# Patient Record
Sex: FEMALE | Race: WHITE | NOT HISPANIC OR LATINO | Employment: OTHER | URBAN - METROPOLITAN AREA
[De-identification: names, ages, dates, MRNs, and addresses within clinical notes are randomized per-mention and may not be internally consistent; named-entity substitution may affect disease eponyms.]

---

## 2017-01-26 ENCOUNTER — ALLSCRIPTS OFFICE VISIT (OUTPATIENT)
Dept: OTHER | Facility: OTHER | Age: 42
End: 2017-01-26

## 2017-01-26 DIAGNOSIS — Z12.31 ENCOUNTER FOR SCREENING MAMMOGRAM FOR MALIGNANT NEOPLASM OF BREAST: ICD-10-CM

## 2017-01-31 LAB
HPV 18 (HISTORICAL): NOT DETECTED
HPV HIGH RISK 16/18 (HISTORICAL): NOT DETECTED
HPV16 (HISTORICAL): NOT DETECTED
PAP (HISTORICAL): NORMAL

## 2018-01-13 VITALS
SYSTOLIC BLOOD PRESSURE: 130 MMHG | HEIGHT: 70 IN | DIASTOLIC BLOOD PRESSURE: 82 MMHG | BODY MASS INDEX: 34.07 KG/M2 | WEIGHT: 238 LBS

## 2018-01-17 NOTE — PROGRESS NOTES
Assessment    1  Encounter for routine gynecological examination (V72 31) (Z01 419)    Plan  Encounter for routine gynecological examination    · Norethin-Eth Estradiol-Fe 0 8-25 MG-MCG Oral Tablet Chewable (Generess FE); 1  tablet po chewed q day   Rx By: Jacky Alfonso; Dispense: 0 Days ; #:30 Tablet Chewable; Refill: 11; For: Encounter for routine gynecological examination; ALEX = N; Verified Transmission to [address]; Last Updated By: System, [employer]; 1/20/2016 9:33:42 AM   · Hemoccult Screening - POC; Status:Complete;   Done: [id number] 09:30AM   Performed: In Office; SDN:[id number];WVFSAXW;  David Pyo for routine gynecological examination; Ordered By:Janna Perez;   · Vitamins can help you get daily requirements that your diet may not be giving you ;  Status:Complete;   Done: [id number]   Ordered;  For:Encounter for routine gynecological examination; Ordered By:Ambrosio Perez;   · We encourage all of our patients to exercise regularly  30 minutes of exercise or physical  activity five or more days a week is recommended for children and adults ;  Status:Complete;   Done: 49GFJ9959   Ordered;  For:Encounter for routine gynecological examination; Ordered By:Ambrosio Perez;   · Follow-up visit in 1 year Evaluation and Treatment  Follow-up  Status: Complete  Done:  [id number]   Ordered;  For: Encounter for routine gynecological examination; Ordered By: Jacky Alfonso Performed:  Due: [id number]; Last Updated By: Edd Mendenhall; 1/20/2016 10:16:08 AM  Encounter for screening mammogram for breast cancer    · MAMMO SCREENING BILATERAL W 3D & CAD; Status:Hold For - Scheduling; Requested  for:20Jan2016;    Perform:[facility]iology; GYJ:[id number];QTLXCBI;  David Pyo for screening mammogram for breast cancer; Ordered By:Ambrosio Perez;    Discussion/Summary  R/jenny pap smear guidelines and patient history/results/risks - pt ok to defer pap this year; stressed importance of APE q year; plan repeat cotesting at next APE Breast cancer screening: monthly self breast exam was advised, mammogram has been ordered and mammogram is needed every year  Continue Generess Fe 1 tab po chewed q day  Encourage calcium and vit D through diet; Encourage 30-40 min weight bearing exercise most days of week  RTO for APE or sooner if needed  Chief Complaint  APE      History of Present Illness  HPI: No vulvo-vaginal itch/burn/abn discharge or odor; No urinary sx - burning/pain/frequency/hematuria  (+) SBEs - no lumps, asymmetry, nipple discharge, or changes in skin of breast b/l  No abd/pelvic pain or HAs;  Not currently in a sexual relationship; declines std/hiv/hep testing  Contraception - Generess Fe  (+) PCP for routine BW/care;       GYN HM, Adult Female [facility]:   General Health:   Reproductive health:  she reports no menstrual problems  Menstrual history:  age at menarche was 11  LMP: the last menstrual period was 12/30/15  The cycles occur approximately every 28 days  The duration of her recent periods usually last 2-3 days  Menstrual Problems: No cristian/metrohagia/dysmenorrhea  Screening: Cervical cancer screening includes 1/15/14 - WNL (-) HRHPV type 16 and 18 neg  Breast cancer screening includes 7/16/15 - Birad 1 neg  Review of Systems    Constitutional: not feeling poorly, no recent weight gain, not feeling tired and no recent weight loss  Cardiovascular: no chest pain  Respiratory: no shortness of breath  Breasts: no complaints of breast pain, breast lump or nipple discharge  Gastrointestinal: no complaints of abdominal pain, no constipation, no nausea or diarrhea, no vomiting, no bloody stools  Genitourinary: no complaints of dysuria, no incontinence, no pelvic pain, no dysmenorrhea, no vaginal discharge or abnormal vaginal bleeding  OB History  Pregnancy History (Brief):   Prior pregnancies: : 1  Para: 1 (full-term) and 1 (living)   Delivery type: 1 vaginal   2007 vag F      Active Problems    1  Contraception management (V25 9) (Z30 9)    Past Medical History    · History of Breast cancer screening (V76 10) (Z12 39)   · History of  1 para 1 (V49 89) (Z78 9)   · History of Menarche (V21 8)   · History of Visit for routine gyn exam (V72 31) (Z01 419)    Surgical History    · History of Gastric Surgery For Morbid Obesity Gastric Bypass    Family History    · Family history of Benign essential hypertension    · Family history of Benign essential hypertension    Social History    · Cultural background   · NON-   ·    · Exercises regularly   · Denied: History of H/O domestic violence   · Never a smoker   · No alcohol use   · No drug use   · Occupation   ·    · Oral contraceptives use   · Primary spoken language English   · Racial background   · WHITE    Current Meds   1  Daily Value Multivitamin Oral Tablet; Therapy: (Recorded:2016) to Recorded   2  Iron TABS; Therapy: (Recorded:2016) to Recorded   3  Layolis FE 0 8-25 MG-MCG Oral Tablet Chewable; TAKE ONE (1) TABLET(S) DAILY; Therapy: [id number] to ([facility])  Requested for: 82GOI6171; Last   Rx:60Rrf0038 Ordered   4  Vitamin D TABS; Therapy: (Recorded:2016) to Recorded    Allergies    1  No Known Drug Allergies    Vitals   Recorded: 56IJH7549 20:00EY   Systolic 062, LUE, Sitting   Diastolic 78, LUE, Sitting   BP Cuff Size Large   Height 5 ft 9 in   Weight 242 lb    BMI Calculated 35 74   BSA Calculated 2 24   LMP [id number]     Physical Exam    Constitutional   General appearance: No acute distress, well appearing and well nourished  Neck   Neck: Normal, supple, trachea midline, no masses  Thyroid: Normal, no thyromegaly  Pulmonary   Respiratory effort: No increased work of breathing or signs of respiratory distress  Auscultation of lungs: Clear to auscultation  Cardiovascular   Auscultation of heart: Normal rate and rhythm, normal S1 and S2, no murmurs      Genitourinary   External genitalia: Normal and no lesions appreciated  Vagina: Normal, no lesions or dryness appreciated  Urethra: Normal     Urethral meatus: Normal     Bladder: Normal, soft, non-tender and no prolapse or masses appreciated  Cervix: Normal, no palpable masses  Uterus: Normal, non-tender, not enlarged, and no palpable masses  Adnexa/parametria: Normal, non-tender and no fullness or masses appreciated  Anus, perineum, and rectum: Normal sphincter tone, no masses, and no prolapse  Chest   Breasts: Normal and no dimpling or skin changes noted  Abdomen   Abdomen: Normal, non-tender, and no organomegaly noted  Liver and spleen: No hepatomegaly or splenomegaly  Stool sample for occult blood: Negative  Lymphatic   Palpation of lymph nodes in neck, axillae, groin and/or other locations: No lymphadenopathy or masses noted  Skin   Skin and subcutaneous tissue: Normal skin turgor and no rashes  Results/Data  Hemoccult Screening - POC 20Jan2016 09:30AM Marino Finch     Test Name Result Flag Reference   Hemoccult Negative         Attending Note  Collaborating Physician Note: Collaborating Note: I agree with the Advanced Practitioner note  I discussed the case with the Advanced Practitioner and reviewed the AP note      Signatures   Electronically signed by :  Anabel Webb, [facility]; Jan 20 2016  9:35AM EST                       (Author)    Electronically signed by : Milla Up MD; Jan 20 2016  1:33PM EST                       (Author)

## 2018-01-30 ENCOUNTER — OFFICE VISIT (OUTPATIENT)
Dept: OBGYN CLINIC | Facility: CLINIC | Age: 43
End: 2018-01-30
Payer: COMMERCIAL

## 2018-01-30 VITALS
SYSTOLIC BLOOD PRESSURE: 130 MMHG | DIASTOLIC BLOOD PRESSURE: 82 MMHG | HEIGHT: 69 IN | BODY MASS INDEX: 38.36 KG/M2 | WEIGHT: 259 LBS

## 2018-01-30 DIAGNOSIS — Z12.31 SCREENING MAMMOGRAM, ENCOUNTER FOR: ICD-10-CM

## 2018-01-30 DIAGNOSIS — Z01.419 ENCOUNTER FOR GYNECOLOGICAL EXAMINATION WITHOUT ABNORMAL FINDING: Primary | ICD-10-CM

## 2018-01-30 DIAGNOSIS — Z30.41 ENCOUNTER FOR SURVEILLANCE OF CONTRACEPTIVE PILLS: ICD-10-CM

## 2018-01-30 PROCEDURE — S0612 ANNUAL GYNECOLOGICAL EXAMINA: HCPCS | Performed by: PHYSICIAN ASSISTANT

## 2018-01-30 RX ORDER — NORETHINDRONE AND ETHINYL ESTRADIOL AND FERROUS FUMARATE 0.8-25(24)
1 KIT ORAL DAILY
Qty: 28 TABLET | Refills: 11 | Status: SHIPPED | OUTPATIENT
Start: 2018-01-30 | End: 2019-01-11 | Stop reason: SDUPTHER

## 2018-01-30 RX ORDER — CYCLOSPORINE 0.5 MG/ML
EMULSION OPHTHALMIC
Refills: 6 | COMMUNITY
Start: 2018-01-20

## 2018-01-30 RX ORDER — TAFLUPROST 0 MG/.3ML
SOLUTION/ DROPS OPHTHALMIC
Refills: 11 | COMMUNITY
Start: 2018-01-11 | End: 2020-08-25 | Stop reason: ALTCHOICE

## 2018-01-30 RX ORDER — NORETHINDRONE AND ETHINYL ESTRADIOL AND FERROUS FUMARATE 0.8-25(24)
1 KIT ORAL DAILY
COMMUNITY
Start: 2015-12-07 | End: 2018-01-30 | Stop reason: SDUPTHER

## 2018-01-30 NOTE — ASSESSMENT & PLAN NOTE
Pap deferred secondary to normal pap 2016  Will continue Laylois OCP  RTO for annual exam in 1 year

## 2018-01-30 NOTE — PROGRESS NOTES
Assessment/Plan:    Encounter for gynecological examination without abnormal finding  Pap deferred secondary to normal pap 2016  Will continue Laylois OCP  RTO for annual exam in 1 year  Problem List Items Addressed This Visit     Encounter for gynecological examination without abnormal finding - Primary     Pap deferred secondary to normal pap 2016  Will continue Laylois OCP  RTO for annual exam in 1 year  Other Visit Diagnoses     Encounter for surveillance of contraceptive pills        Relevant Medications    Norethin-Eth Estradiol-Fe (LAYOLIS FE) 0 8-25 MG-MCG CHEW    Screening mammogram, encounter for        Relevant Orders    Mammo screening bilateral w cad            Subjective:      Patient ID: Porfirio Liu is a 38 y o  female  HPI  39 yo seen for annual exam  Currently on Layolis Fe tolerating well would like to continue  Menses regular heavy on just one day every other month  Denies bowel or bladder issues  Last pap: 1/26/2017 NILM (-)HRHPV  Last mammogram: 2015 BIRADS 1: negative  The following portions of the patient's history were reviewed and updated as appropriate:   She  has a past medical history of Glaucoma  She  does not have any pertinent problems on file  She  has a past surgical history that includes Gastric bypass and Hernia repair  Her family history includes Hyperlipidemia in her maternal grandmother and mother; Hypertension in her maternal grandmother and mother  She  reports that she has never smoked  She has never used smokeless tobacco  She reports that she drinks about 1 2 oz of alcohol per week   She reports that she does not use drugs    Current Outpatient Prescriptions   Medication Sig Dispense Refill    Norethin-Eth Estradiol-Fe (LAYOLIS FE) 0 8-25 MG-MCG CHEW Chew 1 tablet daily 28 tablet 11    cholecalciferol (VITAMIN D3) 1,000 units tablet Take by mouth      Iron Combinations (IRON COMPLEX) CAPS Take by mouth      Multiple Vitamins-Minerals (MULTIVITAMIN ADULT PO) Take by mouth      RESTASIS 0 05 % ophthalmic emulsion INSTILL ONE DROP INTO BOTH EYES EVERY 12 HOURS  6    ZIOPTAN 0 0015 % ophthalmic solution INSTILL ONE DROP TO EACH EYE EVERY DAY  11     No current facility-administered medications for this visit  She has No Known Allergies     Menstrual History:  OB History      Para Term  AB Living    1 1 1     1    SAB TAB Ectopic Multiple Live Births            1         Menarche age: 11  Patient's last menstrual period was 2018  Period Cycle (Days): 28  Period Duration (Days): 3-4  Period Pattern: Regular  Menstrual Flow: Moderate  Dysmenorrhea: None  Review of Systems   Constitutional: Negative for fatigue, fever and unexpected weight change  HENT: Negative for dental problem and sinus pressure  Eyes: Negative for visual disturbance  Respiratory: Negative for cough, shortness of breath and wheezing  Cardiovascular: Negative for chest pain  Gastrointestinal: Negative for abdominal pain, blood in stool, constipation, diarrhea, nausea and vomiting  Endocrine: Negative for polydipsia  Genitourinary: Negative for difficulty urinating, dyspareunia, dysuria, frequency, hematuria, pelvic pain and urgency  Musculoskeletal: Negative for arthralgias and back pain  Neurological: Negative for dizziness, seizures, light-headedness and headaches  Psychiatric/Behavioral: Negative for suicidal ideas  The patient is not nervous/anxious  Objective:     Physical Exam   Constitutional: She is oriented to person, place, and time  She appears well-developed and well-nourished  Neck: Neck supple  No thyroid mass and no thyromegaly present  Cardiovascular: Normal rate, regular rhythm and normal heart sounds  Exam reveals no gallop and no friction rub  No murmur heard  Pulmonary/Chest: Effort normal and breath sounds normal  No respiratory distress  She has no wheezes  She has no rales  Abdominal: Soft  Normal appearance and bowel sounds are normal  She exhibits no distension and no mass  There is no tenderness  There is no rebound and no guarding  Genitourinary: Rectum normal  Rectal exam shows guaiac negative stool  No breast swelling, tenderness, discharge or bleeding  There is no rash, tenderness, lesion or injury on the right labia  There is no rash, tenderness, lesion or injury on the left labia  Uterus is not deviated, not enlarged and not tender  Cervix exhibits no motion tenderness, no discharge and no friability  Right adnexum displays no mass, no tenderness and no fullness  Left adnexum displays no mass, no tenderness and no fullness  No erythema, tenderness or bleeding in the vagina  No signs of injury around the vagina  No vaginal discharge found  Lymphadenopathy:     She has no cervical adenopathy  Right axillary: No pectoral and no lateral adenopathy present  Left axillary: No pectoral and no lateral adenopathy present  Right: No inguinal and no supraclavicular adenopathy present  Left: No inguinal and no supraclavicular adenopathy present  Neurological: She is alert and oriented to person, place, and time  Skin: Skin is warm and dry  No rash noted  No erythema  No pallor  Psychiatric: She has a normal mood and affect   Her behavior is normal  Judgment and thought content normal

## 2019-01-11 DIAGNOSIS — Z30.41 ENCOUNTER FOR SURVEILLANCE OF CONTRACEPTIVE PILLS: ICD-10-CM

## 2019-01-11 RX ORDER — NORETHINDRONE AND ETHINYL ESTRADIOL 0.8-25(24)
KIT ORAL
Qty: 28 TABLET | Refills: 0 | Status: SHIPPED | OUTPATIENT
Start: 2019-01-11 | End: 2019-02-04 | Stop reason: SDUPTHER

## 2019-01-30 NOTE — PROGRESS NOTES
Assessment/Plan   Diagnoses and all orders for this visit:    Encounter for gynecological examination without abnormal finding    Breast cancer screening  -     Mammo screening bilateral w 3d & cad; Future    Encounter for surveillance of contraceptive pills  -     Norethin-Eth Estradiol-Fe (KAITLIB FE) 0 8-25 MG-MCG CHEW; Chew 1 tablet daily Chew and swallow        Discussion    All questions have been answered to her satisfaction  RTO for APE or sooner if needed      Subjective     Pt for annual GYN exam  Currently using OCPs for [facility] and cycle control  Questions OCP use long term  I did r/w pt perimenopause and benefits of pill use as well as risks/benefits  Pt monogamous declines problems w IC, declines desire for STD work up  Janice Mariscal is a 37 y o  female who presents for annual well woman exam    LMP - 1/25/19  Periods are regular q 28 days, lasts 3-4 days  No vulvar itch/burn; No vaginal itch/burn; No abn discharge or odor; No urinary sx - burning/pain/frequency/hematuria  (+) SBEs - no breast masses, asymmetry, nipple discharge or bleeding, changes in skin of breast, or breast tenderness bilaterally  No abd/pelvic pain or HAs; No menopausal symptoms: No hot flashes/night sweats, problems with intercourse, vaginal dryness; sleeping well  Pt is sexually active in a mutually monog/ sexual relationship; No issues with intercourse; She declines std/hiv/hep testing; Feels safe at home    (+) PCP for routine Bw/care- Dr Farhan Joiner    Last Pap - 1/26/17 WNL neg HPV  History of abnormal Pap smear:none  Last mammo - 2011  History of abnormal mammogram: none      Review of Systems   Constitutional: Negative for fatigue, fever and unexpected weight change  HENT: Negative for dental problem, mouth sores, nosebleeds, rhinorrhea, sinus pain, sinus pressure and sore throat  Eyes: Negative for pain, discharge and visual disturbance     Respiratory: Negative for cough, chest tightness, shortness of breath and wheezing  Cardiovascular: Negative for chest pain, palpitations and leg swelling  Gastrointestinal: Negative for blood in stool, constipation, diarrhea, nausea and vomiting  Endocrine: Negative for polydipsia  Genitourinary: Negative for difficulty urinating, dyspareunia, dysuria, menstrual problem, pelvic pain, urgency, vaginal discharge and vaginal pain  Musculoskeletal: Negative for arthralgias, back pain and joint swelling  Allergic/Immunologic: Negative for environmental allergies  Neurological: Negative for seizures, light-headedness and headaches  Hematological: Does not bruise/bleed easily  Psychiatric/Behavioral: Negative for sleep disturbance  The patient is not nervous/anxious          The following portions of the patient's history were reviewed and updated as appropriate: allergies, current medications, past family history, past medical history, past social history, past surgical history and problem list          OB History      Para Term  AB Living    1 1 1     1    SAB TAB Ectopic Multiple Live Births            1          Past Medical History:   Diagnosis Date    Glaucoma        Past Surgical History:   Procedure Laterality Date    GASTRIC BYPASS      surgery for morbid obesity 2012    HERNIA REPAIR      incisional       Family History   Problem Relation Age of Onset    Hypertension Mother         benign essential    Hyperlipidemia Mother     Hypertension Maternal Grandmother         benign essential    Hyperlipidemia Maternal Grandmother        Social History     Social History    Marital status: /Civil Union     Spouse name: N/A    Number of children: N/A    Years of education: N/A     Occupational History          Social History Main Topics    Smoking status: Never Smoker    Smokeless tobacco: Never Used    Alcohol use 1 2 oz/week     2 Standard drinks or equivalent per week    Drug use: No    Sexual activity: Yes Partners: Male     Birth control/ protection: Pill     Other Topics Concern    Not on file     Social History Narrative    Non  -White    Exercises regularly     as per [employer]    Primary language - English             Current Outpatient Prescriptions:     cholecalciferol (VITAMIN D3) 1,000 units tablet, Take by mouth, Disp: , Rfl:     Iron Combinations (IRON COMPLEX) CAPS, Take by mouth, Disp: , Rfl:     Multiple Vitamins-Minerals (MULTIVITAMIN ADULT PO), Take by mouth, Disp: , Rfl:     Norethin-Eth Estradiol-Fe (KAITLIB FE) 0 8-25 MG-MCG CHEW, Chew 1 tablet daily Chew and swallow, Disp: 28 tablet, Rfl: 11    RESTASIS 0 05 % ophthalmic emulsion, INSTILL ONE DROP INTO BOTH EYES EVERY 12 HOURS, Disp: , Rfl: 6    ZIOPTAN 0 0015 % ophthalmic solution, INSTILL ONE DROP TO EACH EYE EVERY DAY, Disp: , Rfl: 11    No Known Allergies    Objective   Vitals:    02/04/19 0904   BP: 124/84   BP Location: Left arm   Patient Position: Sitting   Cuff Size: Large   Weight: 125 kg (276 lb)   Height: 5' 9" (1 753 m)     Physical Exam   Constitutional: She is oriented to person, place, and time  She appears well-developed and well-nourished  HENT:   Head: Normocephalic and atraumatic  Cardiovascular: Normal rate and regular rhythm  Pulmonary/Chest: Effort normal and breath sounds normal  Right breast exhibits no inverted nipple, no mass, no nipple discharge, no skin change and no tenderness  Left breast exhibits no inverted nipple, no mass, no nipple discharge, no skin change and no tenderness  Breasts are symmetrical    Abdominal: Soft  She exhibits no distension and no mass  There is no rebound and no guarding  Genitourinary: Vagina normal and uterus normal  Rectal exam shows guaiac negative stool  No vaginal discharge found  Neurological: She is alert and oriented to person, place, and time  Skin: Skin is warm and dry  Psychiatric: She has a normal mood and affect         Patient Instructions F/u 1 year, earlier as needed  Advised importance of mammo  Continue PCP f/u for routine care

## 2019-02-04 ENCOUNTER — ANNUAL EXAM (OUTPATIENT)
Dept: OBGYN CLINIC | Facility: CLINIC | Age: 44
End: 2019-02-04
Payer: COMMERCIAL

## 2019-02-04 VITALS
SYSTOLIC BLOOD PRESSURE: 124 MMHG | WEIGHT: 276 LBS | BODY MASS INDEX: 40.88 KG/M2 | DIASTOLIC BLOOD PRESSURE: 84 MMHG | HEIGHT: 69 IN

## 2019-02-04 DIAGNOSIS — Z12.39 BREAST CANCER SCREENING: ICD-10-CM

## 2019-02-04 DIAGNOSIS — Z30.41 ENCOUNTER FOR SURVEILLANCE OF CONTRACEPTIVE PILLS: ICD-10-CM

## 2019-02-04 DIAGNOSIS — Z01.419 ENCOUNTER FOR GYNECOLOGICAL EXAMINATION WITHOUT ABNORMAL FINDING: Primary | ICD-10-CM

## 2019-02-04 PROCEDURE — S0612 ANNUAL GYNECOLOGICAL EXAMINA: HCPCS | Performed by: PHYSICIAN ASSISTANT

## 2019-02-04 RX ORDER — NORETHINDRONE AND ETHINYL ESTRADIOL AND FERROUS FUMARATE 0.8-25(24)
1 KIT ORAL DAILY
Qty: 28 TABLET | Refills: 11 | Status: SHIPPED | OUTPATIENT
Start: 2019-02-04 | End: 2020-01-09

## 2019-07-27 ENCOUNTER — APPOINTMENT (EMERGENCY)
Dept: CT IMAGING | Facility: HOSPITAL | Age: 44
End: 2019-07-27
Payer: COMMERCIAL

## 2019-07-27 ENCOUNTER — HOSPITAL ENCOUNTER (EMERGENCY)
Facility: HOSPITAL | Age: 44
Discharge: HOME | End: 2019-07-28
Attending: EMERGENCY MEDICINE | Admitting: EMERGENCY MEDICINE
Payer: COMMERCIAL

## 2019-07-27 DIAGNOSIS — R55 SYNCOPE: Primary | ICD-10-CM

## 2019-07-27 DIAGNOSIS — E03.9 HYPOTHYROID: ICD-10-CM

## 2019-07-27 DIAGNOSIS — E87.6 HYPOKALEMIA: ICD-10-CM

## 2019-07-27 LAB
ALBUMIN SERPL BCP-MCNC: 4 G/DL (ref 3–5.2)
ALP SERPL-CCNC: 64 U/L (ref 43–122)
ALT SERPL W P-5'-P-CCNC: 28 U/L (ref 9–52)
ANION GAP SERPL CALCULATED.3IONS-SCNC: 10 MMOL/L (ref 5–14)
AST SERPL W P-5'-P-CCNC: 27 U/L (ref 14–36)
BACTERIA UR QL AUTO: ABNORMAL /HPF
BASOPHILS # BLD AUTO: 0.1 THOUSANDS/ΜL (ref 0–0.1)
BASOPHILS NFR BLD AUTO: 1 % (ref 0–1)
BILIRUB SERPL-MCNC: 0.2 MG/DL
BILIRUB UR QL STRIP: NEGATIVE
BUN SERPL-MCNC: 13 MG/DL (ref 5–25)
CALCIUM SERPL-MCNC: 9.1 MG/DL (ref 8.4–10.2)
CHLORIDE SERPL-SCNC: 108 MMOL/L (ref 97–108)
CK SERPL-CCNC: 67 U/L (ref 30–135)
CLARITY UR: CLEAR
CO2 SERPL-SCNC: 22 MMOL/L (ref 22–30)
COLOR UR: YELLOW
CREAT SERPL-MCNC: 1.22 MG/DL (ref 0.6–1.2)
EOSINOPHIL # BLD AUTO: 0.2 THOUSAND/ΜL (ref 0–0.4)
EOSINOPHIL NFR BLD AUTO: 2 % (ref 0–6)
ERYTHROCYTE [DISTWIDTH] IN BLOOD BY AUTOMATED COUNT: 14 %
EXT PREG TEST URINE: NEGATIVE
EXT. CONTROL ED NAV: NORMAL
GFR SERPL CREATININE-BSD FRML MDRD: 53 ML/MIN/1.73SQ M
GLUCOSE SERPL-MCNC: 60 MG/DL (ref 70–99)
GLUCOSE UR STRIP-MCNC: ABNORMAL MG/DL
HCT VFR BLD AUTO: 36.7 % (ref 36–46)
HGB BLD-MCNC: 12 G/DL (ref 12–16)
HGB UR QL STRIP.AUTO: NEGATIVE
KETONES UR STRIP-MCNC: NEGATIVE MG/DL
LEUKOCYTE ESTERASE UR QL STRIP: NEGATIVE
LYMPHOCYTES # BLD AUTO: 2.8 THOUSANDS/ΜL (ref 0.5–4)
LYMPHOCYTES NFR BLD AUTO: 32 % (ref 25–45)
MAGNESIUM SERPL-MCNC: 2.1 MG/DL (ref 1.6–2.3)
MCH RBC QN AUTO: 29.5 PG (ref 26–34)
MCHC RBC AUTO-ENTMCNC: 32.6 G/DL (ref 31–36)
MCV RBC AUTO: 91 FL (ref 80–100)
MONOCYTES # BLD AUTO: 0.6 THOUSAND/ΜL (ref 0.2–0.9)
MONOCYTES NFR BLD AUTO: 7 % (ref 1–10)
MUCOUS THREADS UR QL AUTO: ABNORMAL
NEUTROPHILS # BLD AUTO: 5.1 THOUSANDS/ΜL (ref 1.8–7.8)
NEUTS SEG NFR BLD AUTO: 58 % (ref 45–65)
NITRITE UR QL STRIP: NEGATIVE
NON-SQ EPI CELLS URNS QL MICRO: ABNORMAL /HPF
PH UR STRIP.AUTO: 5 [PH]
PHOSPHATE SERPL-MCNC: 2.4 MG/DL (ref 2.5–4.8)
PLATELET # BLD AUTO: 330 THOUSANDS/UL (ref 150–450)
PMV BLD AUTO: 7.9 FL (ref 8.9–12.7)
POTASSIUM SERPL-SCNC: 3.3 MMOL/L (ref 3.6–5)
PROT SERPL-MCNC: 7.2 G/DL (ref 5.9–8.4)
PROT UR STRIP-MCNC: NEGATIVE MG/DL
RBC # BLD AUTO: 4.06 MILLION/UL (ref 4–5.2)
RBC #/AREA URNS AUTO: ABNORMAL /HPF
SODIUM SERPL-SCNC: 140 MMOL/L (ref 137–147)
SP GR UR STRIP.AUTO: 1.02 (ref 1–1.04)
TROPONIN I SERPL-MCNC: <0.01 NG/ML (ref 0–0.03)
UROBILINOGEN UA: NEGATIVE MG/DL
WBC # BLD AUTO: 8.8 THOUSAND/UL (ref 4.5–11)
WBC #/AREA URNS AUTO: ABNORMAL /HPF

## 2019-07-27 PROCEDURE — 84443 ASSAY THYROID STIM HORMONE: CPT | Performed by: EMERGENCY MEDICINE

## 2019-07-27 PROCEDURE — 93005 ELECTROCARDIOGRAM TRACING: CPT

## 2019-07-27 PROCEDURE — 99285 EMERGENCY DEPT VISIT HI MDM: CPT

## 2019-07-27 PROCEDURE — 85025 COMPLETE CBC W/AUTO DIFF WBC: CPT | Performed by: EMERGENCY MEDICINE

## 2019-07-27 PROCEDURE — 83735 ASSAY OF MAGNESIUM: CPT | Performed by: EMERGENCY MEDICINE

## 2019-07-27 PROCEDURE — 81025 URINE PREGNANCY TEST: CPT | Performed by: EMERGENCY MEDICINE

## 2019-07-27 PROCEDURE — 84100 ASSAY OF PHOSPHORUS: CPT | Performed by: EMERGENCY MEDICINE

## 2019-07-27 PROCEDURE — 81001 URINALYSIS AUTO W/SCOPE: CPT | Performed by: EMERGENCY MEDICINE

## 2019-07-27 PROCEDURE — 36415 COLL VENOUS BLD VENIPUNCTURE: CPT | Performed by: EMERGENCY MEDICINE

## 2019-07-27 PROCEDURE — 84439 ASSAY OF FREE THYROXINE: CPT | Performed by: EMERGENCY MEDICINE

## 2019-07-27 PROCEDURE — 70450 CT HEAD/BRAIN W/O DYE: CPT

## 2019-07-27 PROCEDURE — 96360 HYDRATION IV INFUSION INIT: CPT

## 2019-07-27 PROCEDURE — 99284 EMERGENCY DEPT VISIT MOD MDM: CPT | Performed by: EMERGENCY MEDICINE

## 2019-07-27 PROCEDURE — 80053 COMPREHEN METABOLIC PANEL: CPT | Performed by: EMERGENCY MEDICINE

## 2019-07-27 PROCEDURE — 84484 ASSAY OF TROPONIN QUANT: CPT | Performed by: EMERGENCY MEDICINE

## 2019-07-27 PROCEDURE — 82550 ASSAY OF CK (CPK): CPT | Performed by: EMERGENCY MEDICINE

## 2019-07-27 RX ADMIN — SODIUM CHLORIDE 1000 ML: 0.9 INJECTION, SOLUTION INTRAVENOUS at 22:55

## 2019-07-28 VITALS
BODY MASS INDEX: 42.42 KG/M2 | TEMPERATURE: 98.2 F | HEART RATE: 80 BPM | OXYGEN SATURATION: 100 % | SYSTOLIC BLOOD PRESSURE: 133 MMHG | DIASTOLIC BLOOD PRESSURE: 78 MMHG | RESPIRATION RATE: 20 BRPM | WEIGHT: 286.38 LBS | HEIGHT: 69 IN

## 2019-07-28 LAB
GLUCOSE SERPL-MCNC: 154 MG/DL (ref 65–140)
T4 FREE SERPL-MCNC: 0.82 NG/DL (ref 0.76–1.46)
TSH SERPL DL<=0.05 MIU/L-ACNC: 18.3 UIU/ML (ref 0.47–4.68)

## 2019-07-28 PROCEDURE — 82948 REAGENT STRIP/BLOOD GLUCOSE: CPT

## 2019-07-28 RX ORDER — SACCHAROMYCES BOULARDII 250 MG
250 CAPSULE ORAL 2 TIMES DAILY
COMMUNITY
End: 2019-07-28 | Stop reason: CLARIF

## 2019-07-28 RX ORDER — POTASSIUM CHLORIDE 750 MG/1
20 TABLET, EXTENDED RELEASE ORAL ONCE
Status: COMPLETED | OUTPATIENT
Start: 2019-07-28 | End: 2019-07-28

## 2019-07-28 RX ORDER — MULTIVIT WITH MINERALS/LUTEIN
1000 TABLET ORAL DAILY
COMMUNITY

## 2019-07-28 RX ADMIN — POTASSIUM CHLORIDE 20 MEQ: 10 TABLET, EXTENDED RELEASE ORAL at 01:00

## 2019-07-28 NOTE — ED PROVIDER NOTES
History  Chief Complaint   Patient presents with    Syncope     Patient was at the [employer] game and had a syncopal episode times 2, does not remember everything and possibly hit her head  52 y/o female BBA for c/o low blood pressure, dizziness, and syncope x 2 episodes while at ball game tonight  Patient alert, cooperative, and oriented  States had two small cans of beer at game; denies illicit drugs  Prior history of syncope  Denies chest pain, dyspnea, fever/chills, headache, and/or visual disturbances  Per patient, unsure as to any closed head trauma  Patient states fell while attempting to go from sitting to standing position  Given IVFs en route; patient asymptomatic  Prior to Admission Medications   Prescriptions Last Dose Informant Patient Reported? Taking?    Ascorbic Acid (VITAMIN C) 1000 MG tablet 7/27/2019 at Unknown time  Yes Yes   Sig: Take 1,000 mg by mouth daily   Iron Combinations (IRON COMPLEX) CAPS More than a month at Unknown time  Yes No   Sig: Take by mouth   Multiple Vitamins-Minerals (MULTIVITAMIN ADULT PO) 7/27/2019 at Unknown time  Yes Yes   Sig: Take by mouth   Norethin-Eth Estradiol-Fe (KAITLIB FE) 0 8-25 MG-MCG CHEW 7/27/2019 at Unknown time  No Yes   Sig: Chew 1 tablet daily Chew and swallow   RESTASIS 0 05 % ophthalmic emulsion 7/27/2019 at Unknown time  Yes Yes   Sig: INSTILL ONE DROP INTO BOTH EYES EVERY 12 HOURS   ZIOPTAN 0 0015 % ophthalmic solution Past Week at Unknown time  Yes Yes   Sig: INSTILL ONE DROP TO EACH EYE EVERY DAY   cholecalciferol (VITAMIN D3) 1,000 units tablet Past Week at Unknown time  Yes Yes   Sig: Take by mouth      Facility-Administered Medications: None       Past Medical History:   Diagnosis Date    Glaucoma        Past Surgical History:   Procedure Laterality Date    GASTRIC BYPASS      surgery for morbid obesity 12/2012    HERNIA REPAIR      incisional       Family History   Problem Relation Age of Onset    Hypertension Mother benign essential    Hyperlipidemia Mother     Hypertension Maternal Grandmother         benign essential    Hyperlipidemia Maternal Grandmother      I have reviewed and agree with the history as documented  Social History     Tobacco Use    Smoking status: Never Smoker    Smokeless tobacco: Never Used   Substance Use Topics    Alcohol use: Yes     Alcohol/week: 2 0 standard drinks     Types: 2 Standard drinks or equivalent per week    Drug use: No        Review of Systems   Neurological: Positive for dizziness and syncope  All other systems reviewed and are negative  Physical Exam  Physical Exam   Constitutional: She is oriented to person, place, and time  She appears well-developed and well-nourished  No distress  HENT:   Head: Normocephalic and atraumatic  Right Ear: External ear normal    Left Ear: External ear normal    Nose: Nose normal    Mouth/Throat: Oropharynx is clear and moist    Eyes: Pupils are equal, round, and reactive to light  EOM are normal  No scleral icterus  Neck: Normal range of motion  Neck supple  Cardiovascular: Normal rate, regular rhythm and normal heart sounds  Pulmonary/Chest: Effort normal and breath sounds normal    Abdominal: Soft  Bowel sounds are normal    Musculoskeletal: Normal range of motion  She exhibits no edema, tenderness or deformity  Neurological: She is alert and oriented to person, place, and time  She displays normal reflexes  No cranial nerve deficit or sensory deficit  She exhibits normal muscle tone  Coordination normal    Skin: Skin is warm and dry  Capillary refill takes less than 2 seconds  Psychiatric: She has a normal mood and affect  Vitals reviewed        Vital Signs  ED Triage Vitals [07/27/19 2236]   Temperature Pulse Respirations Blood Pressure SpO2   98 2 °F (36 8 °C) 83 16 127/66 98 %      Temp Source Heart Rate Source Patient Position - Orthostatic VS BP Location FiO2 (%)   Tympanic Monitor Lying Left arm --      Pain Score       No Pain           Vitals:    07/27/19 2300 07/28/19 0001 07/28/19 0100 07/28/19 0125   BP: 134/73 130/66 137/78 133/78   Pulse: 83 68 83 80   Patient Position - Orthostatic VS: Sitting Lying Sitting Sitting         Visual Acuity  Visual Acuity      Most Recent Value   L Pupil Size (mm)  3   R Pupil Size (mm)  3          ED Medications  Medications   sodium chloride 0 9 % bolus 1,000 mL (0 mL Intravenous Stopped 7/27/19 2341)   potassium chloride (K-DUR,KLOR-CON) CR tablet 20 mEq (20 mEq Oral Given 7/28/19 0100)       Diagnostic Studies  Results Reviewed     Procedure Component Value Units Date/Time    TSH [[id number]]  (Abnormal) Collected:  07/27/19 2304    Lab Status:  Final result Specimen:  Blood from Arm, Right Updated:  07/28/19 0002     TSH 3RD GENERATON 18 300 uIU/mL     Narrative:       Patients undergoing fluorescein dye angiography may retain small amounts of fluorescein in the body for 48-72 hours post procedure  Samples containing fluorescein can produce falsely depressed TSH values  If the patient had this procedure,a specimen should be resubmitted post fluorescein clearance        Urine Microscopic [[id number]]  (Abnormal) Collected:  07/27/19 2256    Lab Status:  Final result Specimen:  Urine, Clean Catch Updated:  07/27/19 2344     RBC, UA None Seen /hpf      WBC, UA None Seen /hpf      Epithelial Cells Occasional /hpf      Bacteria, UA Occasional /hpf      MUCUS THREADS Moderate    Troponin I [[id number]]  (Normal) Collected:  07/27/19 2304    Lab Status:  Final result Specimen:  Blood from Arm, Right Updated:  07/27/19 2344     Troponin I <0 01 ng/mL     Phosphorus [[id number]]  (Abnormal) Collected:  07/27/19 2304    Lab Status:  Final result Specimen:  Blood from Arm, Right Updated:  07/27/19 2334     Phosphorus 2 4 mg/dL     Magnesium [[id number]]  (Normal) Collected:  07/27/19 2304    Lab Status:  Final result Specimen:  Blood from Arm, Right Updated:  07/27/19 2334     Magnesium 2 1 mg/dL     CK Total with Reflex CKMB [[id number]]  (Normal) Collected:  07/27/19 2304    Lab Status:  Final result Specimen:  Blood from Arm, Right Updated:  07/27/19 2334     Total CK 67 U/L     UA w Reflex to Microscopic [[id number]]  (Abnormal) Collected:  07/27/19 2256    Lab Status:  Final result Specimen:  Urine, Clean Catch Updated:  07/27/19 2334     Color, UA Yellow     Clarity, UA Clear     Specific [place], UA 1 020     pH, UA 5 0     Leukocytes, UA Negative     Nitrite, UA Negative     Protein, UA Negative mg/dl      Glucose, UA >=1000 (1%) mg/dl      Ketones, UA Negative mg/dl      Bilirubin, UA Negative     Blood, UA Negative     UROBILINOGEN UA Negative mg/dL     Comprehensive metabolic panel [[id number]]  (Abnormal) Collected:  07/27/19 2304    Lab Status:  Final result Specimen:  Blood from Arm, Right Updated:  07/27/19 2333     Sodium 140 mmol/L      Potassium 3 3 mmol/L      Chloride 108 mmol/L      CO2 22 mmol/L      ANION GAP 10 mmol/L      BUN 13 mg/dL      Creatinine 1 22 mg/dL      Glucose 60 mg/dL      Calcium 9 1 mg/dL      AST 27 U/L      ALT 28 U/L      Alkaline Phosphatase 64 U/L      Total Protein 7 2 g/dL      Albumin 4 0 g/dL      Total Bilirubin 0 20 mg/dL      eGFR 53 ml/min/1 73sq m     Narrative:       [place] guidelines for Chronic Kidney Disease (CKD):     Stage 1 with normal or high GFR (GFR > 90 mL/min/1 73 square meters)    Stage 2 Mild CKD (GFR = 60-89 mL/min/1 73 square meters)    Stage 3A Moderate CKD (GFR = 45-59 mL/min/1 73 square meters)    Stage 3B Moderate CKD (GFR = 30-44 mL/min/1 73 square meters)    Stage 4 Severe CKD (GFR = 15-29 mL/min/1 73 square meters)    Stage 5 End Stage CKD (GFR <15 mL/min/1 73 square meters)  Note: GFR calculation is accurate only with a steady state creatinine    CBC and differential [[id number]]  (Abnormal) Collected:  07/27/19 2304    Lab Status:  Final result Specimen:  Blood from Arm, Right Updated: 07/27/19 2324     WBC 8 80 Thousand/uL      RBC 4 06 Million/uL      Hemoglobin 12 0 g/dL      Hematocrit 36 7 %      MCV 91 fL      MCH 29 5 pg      MCHC 32 6 g/dL      RDW 14 0 %      MPV 7 9 fL      Platelets 003 Thousands/uL      Neutrophils Relative 58 %      Lymphocytes Relative 32 %      Monocytes Relative 7 %      Eosinophils Relative 2 %      Basophils Relative 1 %      Neutrophils Absolute 5 10 Thousands/µL      Lymphocytes Absolute 2 80 Thousands/µL      Monocytes Absolute 0 60 Thousand/µL      Eosinophils Absolute 0 20 Thousand/µL      Basophils Absolute 0 10 Thousands/µL     T4, free [[id number]] Collected:  07/27/19 2304    Lab Status: In process Specimen:  Blood from Arm, Right Updated:  07/27/19 2313    POCT pregnancy, urine [[id number]]  (Normal) Resulted:  07/27/19 2304    Lab Status:  Final result Updated:  07/27/19 2304     EXT PREG TEST UR (Ref: Negative) NEGATIVE     Control Valid                 CT head without contrast   Final Result by Denis Lopez MD (07/28 0043)      No acute intracranial abnormality  Workstation performed: [id number]                    Procedures  Procedures       ED Course  ED Course as of Jul 28 0146   Sat Jul 27, 2019   2254 EKG: nsr @ 77 bpm, no ST-T wave changes, normal intervals      2345 Potassium(!): 3 3                               MDM    Disposition  Final diagnoses:   Syncope   Hypokalemia   Hypothyroid     Time reflects when diagnosis was documented in both MDM as applicable and the Disposition within this note     Time User Action Codes Description Comment    7/28/2019  1:45 AM Joyceann Nova Add [R55] Syncope     7/28/2019  1:45 AM Joyceann Nova Add [E87 6] Hypokalemia     7/28/2019  1:45 AM Joyceann Nova Add [E03 9] Hypothyroid       ED Disposition     ED Disposition Condition Date/Time Comment    Discharge Stable Sun Jul 28, 2019  1:45 AM Laqueta Mode discharge to home/self care              Follow-up Information     Follow up With Specialties Details Why Contact Info Additional Information    [address] [facility] Go to  As needed [address], [address] [address]  [address] [place] [address], [address], [address] [place] [address], [id number]          Patient's Medications   Discharge Prescriptions    No medications on file     No discharge procedures on file      ED Provider  Electronically Signed by           Jefferson Dos Santos DO  07/28/19 8254

## 2019-07-30 LAB
ATRIAL RATE: 77 BPM
P AXIS: 33 DEGREES
PR INTERVAL: 176 MS
QRS AXIS: 8 DEGREES
QRSD INTERVAL: 106 MS
QT INTERVAL: 394 MS
QTC INTERVAL: 445 MS
T WAVE AXIS: 49 DEGREES
VENTRICULAR RATE: 77 BPM

## 2019-07-30 PROCEDURE — 93010 ELECTROCARDIOGRAM REPORT: CPT | Performed by: INTERNAL MEDICINE

## 2019-10-15 ENCOUNTER — HOSPITAL ENCOUNTER (OUTPATIENT)
Dept: MAMMOGRAPHY | Facility: HOSPITAL | Age: 44
Discharge: HOME | End: 2019-10-15
Attending: PHYSICIAN ASSISTANT
Payer: COMMERCIAL

## 2019-10-15 VITALS — WEIGHT: 286 LBS | BODY MASS INDEX: 42.36 KG/M2 | HEIGHT: 69 IN

## 2019-10-15 DIAGNOSIS — Z12.31 SCREENING MAMMOGRAM, ENCOUNTER FOR: ICD-10-CM

## 2019-10-15 PROCEDURE — 77067 SCR MAMMO BI INCL CAD: CPT

## 2019-11-22 ENCOUNTER — TRANSCRIBE ORDERS (OUTPATIENT)
Dept: LAB | Facility: CLINIC | Age: 44
End: 2019-11-22

## 2019-11-22 ENCOUNTER — LAB (OUTPATIENT)
Dept: LAB | Facility: CLINIC | Age: 44
End: 2019-11-22
Payer: COMMERCIAL

## 2019-11-22 DIAGNOSIS — R79.89 OTHER SPECIFIED ABNORMAL FINDINGS OF BLOOD CHEMISTRY: ICD-10-CM

## 2019-11-22 DIAGNOSIS — R79.89 OTHER SPECIFIED ABNORMAL FINDINGS OF BLOOD CHEMISTRY: Primary | ICD-10-CM

## 2019-11-22 DIAGNOSIS — E11.65 UNCONTROLLED TYPE 2 DIABETES MELLITUS WITH HYPERGLYCEMIA (HCC): Primary | ICD-10-CM

## 2019-11-22 LAB
EST. AVERAGE GLUCOSE BLD GHB EST-MCNC: 103 MG/DL
HBA1C MFR BLD: 5.2 % (ref 4.2–6.3)
T4 FREE SERPL-MCNC: 0.85 NG/DL (ref 0.76–1.46)
TSH SERPL DL<=0.05 MIU/L-ACNC: 8.16 UIU/ML (ref 0.36–3.74)

## 2019-11-22 PROCEDURE — 36415 COLL VENOUS BLD VENIPUNCTURE: CPT | Performed by: CHIROPRACTOR

## 2019-11-22 PROCEDURE — 84439 ASSAY OF FREE THYROXINE: CPT

## 2019-11-22 PROCEDURE — 84443 ASSAY THYROID STIM HORMONE: CPT

## 2019-11-22 PROCEDURE — 83036 HEMOGLOBIN GLYCOSYLATED A1C: CPT | Performed by: CHIROPRACTOR

## 2020-01-09 DIAGNOSIS — Z30.41 ENCOUNTER FOR SURVEILLANCE OF CONTRACEPTIVE PILLS: ICD-10-CM

## 2020-01-09 RX ORDER — NORETHINDRONE AND ETHINYL ESTRADIOL 0.8-25(24)
KIT ORAL
Qty: 28 TABLET | Refills: 1 | Status: SHIPPED | OUTPATIENT
Start: 2020-01-09 | End: 2020-03-03

## 2020-02-12 ENCOUNTER — ANNUAL EXAM (OUTPATIENT)
Dept: OBGYN CLINIC | Facility: CLINIC | Age: 45
End: 2020-02-12
Payer: COMMERCIAL

## 2020-02-12 VITALS
HEIGHT: 69 IN | DIASTOLIC BLOOD PRESSURE: 84 MMHG | SYSTOLIC BLOOD PRESSURE: 130 MMHG | BODY MASS INDEX: 42.95 KG/M2 | WEIGHT: 290 LBS

## 2020-02-12 DIAGNOSIS — Z30.41 ENCOUNTER FOR SURVEILLANCE OF CONTRACEPTIVE PILLS: ICD-10-CM

## 2020-02-12 DIAGNOSIS — Z01.419 ENCOUNTER FOR GYNECOLOGICAL EXAMINATION WITHOUT ABNORMAL FINDING: Primary | ICD-10-CM

## 2020-02-12 DIAGNOSIS — Z12.31 OTHER SCREENING MAMMOGRAM: ICD-10-CM

## 2020-02-12 PROCEDURE — S0612 ANNUAL GYNECOLOGICAL EXAMINA: HCPCS | Performed by: PHYSICIAN ASSISTANT

## 2020-02-12 RX ORDER — LEVOTHYROXINE SODIUM 0.03 MG/1
TABLET ORAL
COMMUNITY
Start: 2020-01-28 | End: 2020-05-20

## 2020-02-12 RX ORDER — BIMATOPROST 0.3 MG/ML
SOLUTION/ DROPS OPHTHALMIC
COMMUNITY
Start: 2019-12-29

## 2020-02-12 RX ORDER — NORETHINDRONE AND ETHINYL ESTRADIOL AND FERROUS FUMARATE 0.8-25(24)
0.8 KIT ORAL DAILY
Qty: 90 TABLET | Refills: 3 | Status: SHIPPED | OUTPATIENT
Start: 2020-02-12 | End: 2020-08-25 | Stop reason: ALTCHOICE

## 2020-02-12 NOTE — ASSESSMENT & PLAN NOTE
Pap guidelines reviewed  Pap deferred secondary to negative pap and HPV in 2017  Will plan to continue Kaitlib chewable OCP  Return to office for annual or as needed

## 2020-02-12 NOTE — PROGRESS NOTES
Assessment/Plan   Problem List Items Addressed This Visit        Other    Encounter for gynecological examination without abnormal finding - Primary     Pap guidelines reviewed  Pap deferred secondary to negative pap and HPV in 2017  Will plan to continue Kaitlib chewable OCP  Return to office for annual or as needed  Other Visit Diagnoses     Encounter for surveillance of contraceptive pills        Relevant Medications    Norethin-Eth Estradiol-Fe 0 8-25 MG-MCG CHEW    Other screening mammogram        Relevant Orders    Mammo screening bilateral w cad          Subjective:     Patient ID: Saranya Ferrer is a 53 y o  y o  female  HPI  54 yo seen for annual exam  Currently on Kaitlib chewable OCP  Tolerates well  Does report every other month is heavy only 1 day changing pad every 2-3 hrs  Tolerable at this time  Denies bowel or bladder issues  Last pap: 1/26/2017 NILM (-)HRHPV  Last mammogram: 10/15/2019 BIRADS 1: Negative  The following portions of the patient's history were reviewed and updated as appropriate:   She  has a past medical history of Glaucoma  She   Patient Active Problem List    Diagnosis Date Noted    Encounter for gynecological examination without abnormal finding 01/30/2018    Glaucoma 01/20/2016     She  has a past surgical history that includes Gastric bypass; Hernia repair; and Bariatric Surgery (12/18/12)  Her family history includes Hyperlipidemia in her maternal grandmother and mother; Hypertension in her maternal grandmother and mother; No Known Problems in her brother, daughter, father, paternal grandmother, and sister; Prostate cancer (age of onset: 48) in her maternal grandfather and paternal grandfather  She  reports that she has never smoked  She has never used smokeless tobacco  She reports that she drinks about 2 0 standard drinks of alcohol per week  She reports that she does not use drugs    Current Outpatient Medications   Medication Sig Dispense Refill  Ascorbic Acid (VITAMIN C) 1000 MG tablet Take 1,000 mg by mouth daily      bimatoprost (LUMIGAN) 0 03 % ophthalmic drops INSTILL ONE DROP IN BOTH EYES AT BEDTIME      cholecalciferol (VITAMIN D3) 1,000 units tablet Take by mouth      Iron Combinations (IRON COMPLEX) CAPS Take by mouth      KAITLIB FE 0 8-25 MG-MCG CHEW CHEW AND SWAALOW ONE TABLET DAILY 28 tablet 1    levothyroxine 25 mcg tablet       Multiple Vitamins-Minerals (MULTIVITAMIN ADULT PO) Take by mouth      Norethin-Eth Estradiol-Fe 0 8-25 MG-MCG CHEW Chew 0 8 mg daily 90 tablet 3    RESTASIS 0 05 % ophthalmic emulsion INSTILL ONE DROP INTO BOTH EYES EVERY 12 HOURS  6    ZIOPTAN 0 0015 % ophthalmic solution INSTILL ONE DROP TO EACH EYE EVERY DAY  11     No current facility-administered medications for this visit  She has No Known Allergies       Menstrual History:  OB History        1    Para   1    Term   1            AB        Living   1       SAB        TAB        Ectopic        Multiple        Live Births   1                Menarche age: 11  Patient's last menstrual period was 2020 (exact date)  Period Cycle (Days): 28  Period Duration (Days): 3  Period Pattern: Regular  Menstrual Flow: Moderate  Dysmenorrhea: None      Review of Systems   Constitutional: Negative for fatigue, fever and unexpected weight change  HENT: Negative for dental problem and sinus pressure  Eyes: Negative for visual disturbance  Respiratory: Negative for cough, shortness of breath and wheezing  Cardiovascular: Negative for chest pain  Gastrointestinal: Negative for abdominal pain, blood in stool, constipation, diarrhea, nausea and vomiting  Endocrine: Negative for polydipsia  Genitourinary: Negative for difficulty urinating, dyspareunia, dysuria, frequency, hematuria, pelvic pain and urgency  Musculoskeletal: Negative for arthralgias and back pain     Neurological: Negative for dizziness, seizures, light-headedness and headaches  Psychiatric/Behavioral: Negative for suicidal ideas  The patient is not nervous/anxious  Objective:  Vitals:    02/12/20 0808   BP: 130/84   BP Location: Left arm   Patient Position: Sitting   Cuff Size: Large   Weight: 132 kg (290 lb)   Height: 5' 9" (1 753 m)      Physical Exam   Constitutional: She is oriented to person, place, and time  She appears well-developed and well-nourished  Genitourinary: Rectum normal, vagina normal and uterus normal  Pelvic exam was performed with patient supine  There is no rash, tenderness, lesion, injury or Bartholin's cyst on the right labia  There is no rash, tenderness, lesion, injury or Bartholin's cyst on the left labia  Vagina exhibits no lesion  No erythema, tenderness or bleeding in the vagina  No signs of injury around the vagina  No vaginal discharge found  Right adnexum does not display mass, does not display tenderness and does not display fullness  Left adnexum does not display mass, does not display tenderness and does not display fullness  Cervix does not exhibit motion tenderness, lesion or discharge  Uterus is not enlarged, tender, exhibiting a mass, irregular (is regular) or mobile  Rectal exam shows no external hemorrhoid, no internal hemorrhoid, no fissure, no mass, no tenderness, anal tone normal and guaiac negative stool  HENT:   Head: Normocephalic and atraumatic  Neck: No thyromegaly present  Cardiovascular: Normal rate, regular rhythm and normal heart sounds  Exam reveals no gallop and no friction rub  No murmur heard  Pulmonary/Chest: Effort normal and breath sounds normal  No respiratory distress  She has no wheezes  Right breast exhibits no inverted nipple, no mass, no nipple discharge, no skin change and no tenderness  Left breast exhibits no inverted nipple, no mass, no nipple discharge, no skin change and no tenderness  No breast swelling, tenderness, discharge or bleeding  Breasts are symmetrical    Abdominal: Soft   She exhibits no distension and no mass  There is no tenderness  There is no rebound and no guarding  No hernia  Lymphadenopathy:     She has no cervical adenopathy  Right: No inguinal adenopathy present  Left: No inguinal adenopathy present  Neurological: She is alert and oriented to person, place, and time  Skin: Skin is warm and dry  Psychiatric: She has a normal mood and affect   Her behavior is normal

## 2020-03-03 ENCOUNTER — APPOINTMENT (OUTPATIENT)
Dept: LAB | Facility: CLINIC | Age: 45
End: 2020-03-03
Payer: COMMERCIAL

## 2020-03-03 ENCOUNTER — TRANSCRIBE ORDERS (OUTPATIENT)
Dept: LAB | Facility: CLINIC | Age: 45
End: 2020-03-03

## 2020-03-03 DIAGNOSIS — E03.9 HYPOTHYROIDISM, ADULT: ICD-10-CM

## 2020-03-03 DIAGNOSIS — E03.9 HYPOTHYROIDISM, ADULT: Primary | ICD-10-CM

## 2020-03-03 DIAGNOSIS — Z30.41 ENCOUNTER FOR SURVEILLANCE OF CONTRACEPTIVE PILLS: ICD-10-CM

## 2020-03-03 LAB
T4 FREE SERPL-MCNC: 1.05 NG/DL (ref 0.76–1.46)
TSH SERPL DL<=0.05 MIU/L-ACNC: 4.21 UIU/ML (ref 0.36–3.74)

## 2020-03-03 PROCEDURE — 84443 ASSAY THYROID STIM HORMONE: CPT

## 2020-03-03 PROCEDURE — 84439 ASSAY OF FREE THYROXINE: CPT

## 2020-03-03 PROCEDURE — 36415 COLL VENOUS BLD VENIPUNCTURE: CPT

## 2020-03-03 RX ORDER — NORETHINDRONE AND ETHINYL ESTRADIOL 0.8-25(24)
KIT ORAL
Qty: 28 TABLET | Refills: 1 | Status: SHIPPED | OUTPATIENT
Start: 2020-03-03 | End: 2021-02-01

## 2020-05-20 DIAGNOSIS — E03.9 ACQUIRED HYPOTHYROIDISM: Primary | ICD-10-CM

## 2020-05-20 PROBLEM — Z01.419 ENCOUNTER FOR GYNECOLOGICAL EXAMINATION WITHOUT ABNORMAL FINDING: Status: RESOLVED | Noted: 2018-01-30 | Resolved: 2020-05-20

## 2020-05-20 PROBLEM — Z87.898 HISTORY OF SYNCOPE: Status: ACTIVE | Noted: 2020-05-20

## 2020-05-20 RX ORDER — LEVOTHYROXINE SODIUM 0.03 MG/1
TABLET ORAL
Qty: 30 TABLET | Refills: 5 | Status: SHIPPED | OUTPATIENT
Start: 2020-05-20 | End: 2020-09-22

## 2020-08-25 ENCOUNTER — OFFICE VISIT (OUTPATIENT)
Dept: FAMILY MEDICINE CLINIC | Facility: CLINIC | Age: 45
End: 2020-08-25
Payer: COMMERCIAL

## 2020-08-25 VITALS
OXYGEN SATURATION: 98 % | HEART RATE: 64 BPM | TEMPERATURE: 97.9 F | BODY MASS INDEX: 40.43 KG/M2 | HEIGHT: 69 IN | SYSTOLIC BLOOD PRESSURE: 120 MMHG | WEIGHT: 273 LBS | DIASTOLIC BLOOD PRESSURE: 80 MMHG

## 2020-08-25 DIAGNOSIS — E03.9 ACQUIRED HYPOTHYROIDISM: ICD-10-CM

## 2020-08-25 DIAGNOSIS — Z23 ENCOUNTER FOR IMMUNIZATION: ICD-10-CM

## 2020-08-25 DIAGNOSIS — F51.01 PRIMARY INSOMNIA: ICD-10-CM

## 2020-08-25 DIAGNOSIS — Z13.6 SCREENING FOR CARDIOVASCULAR CONDITION: ICD-10-CM

## 2020-08-25 DIAGNOSIS — Z00.00 ENCOUNTER FOR ANNUAL PHYSICAL EXAM: Primary | ICD-10-CM

## 2020-08-25 PROBLEM — Z98.84 HISTORY OF BARIATRIC SURGERY: Status: ACTIVE | Noted: 2020-08-25

## 2020-08-25 PROCEDURE — 90471 IMMUNIZATION ADMIN: CPT | Performed by: FAMILY MEDICINE

## 2020-08-25 PROCEDURE — 99396 PREV VISIT EST AGE 40-64: CPT | Performed by: FAMILY MEDICINE

## 2020-08-25 PROCEDURE — 90715 TDAP VACCINE 7 YRS/> IM: CPT | Performed by: FAMILY MEDICINE

## 2020-08-25 PROCEDURE — 3008F BODY MASS INDEX DOCD: CPT | Performed by: FAMILY MEDICINE

## 2020-08-25 PROCEDURE — 3725F SCREEN DEPRESSION PERFORMED: CPT | Performed by: FAMILY MEDICINE

## 2020-08-25 PROCEDURE — 1036F TOBACCO NON-USER: CPT | Performed by: FAMILY MEDICINE

## 2020-08-25 NOTE — PATIENT INSTRUCTIONS

## 2020-08-25 NOTE — ASSESSMENT & PLAN NOTE
CPE done  Tdap given  Recommend flu shot in fall  Pap UTD  Pt to schedule mammo  Cont exercise and wt loss  Not a smoker  Occas ETOH  Will check lipids

## 2020-08-25 NOTE — PROGRESS NOTES
BMI Counseling: Body mass index is 40 32 kg/m²  The BMI is above normal  Nutrition recommendations include decreasing portion sizes, encouraging healthy choices of fruits and vegetables, consuming healthier snacks and moderation in carbohydrate intake  Exercise recommendations include exercising 3-5 times per week  No pharmacotherapy was ordered  Assessment/Plan:         Problem List Items Addressed This Visit        Endocrine    Hypothyroidism     Levels ok in March 2020  Takes levothyroxine 25 mcg qd  Relevant Orders    T4, free    TSH, 3rd generation       Other    Primary insomnia     Pt wakes up frequently  Will try melatonin 5 mg qhs  Encounter for annual physical exam - Primary     CPE done  Tdap given  Recommend flu shot in fall  Pap UTD  Pt to schedule mammo  Cont exercise and wt loss  Not a smoker  Occas ETOH  Will check lipids  Other Visit Diagnoses     Screening for cardiovascular condition        Relevant Orders    Lipid panel    Encounter for immunization        Relevant Orders    TDAP VACCINE GREATER THAN OR EQUAL TO 8YO IM            Subjective:      Patient ID: Rock Palmer is a 50 y o  female  Pt here for physical  Doing well  No cp/sob  No headaches  No abd pain  Exercising and lost 6 lbs since last summer  Has dec sleep  Wakes up a lot  Not taking anything at present for it  Unsure of last Tdap  Doesn't get flu shot  Had GYN exam in Jan 2020 and has order for Mammo  No new c/o  The following portions of the patient's history were reviewed and updated as appropriate:   She has a past medical history of Glaucoma  ,  does not have any pertinent problems on file  ,   has a past surgical history that includes Gastric bypass; Hernia repair; and Bariatric Surgery (12/18/12)  ,  family history includes Hyperlipidemia in her maternal grandmother and mother; Hypertension in her maternal grandmother and mother; No Known Problems in her brother, daughter, father, paternal grandmother, and sister; Prostate cancer (age of onset: 48) in her maternal grandfather and paternal grandfather  ,   reports that she has never smoked  She has never used smokeless tobacco  She reports current alcohol use of about 2 0 standard drinks of alcohol per week  She reports that she does not use drugs  ,  has No Known Allergies     Current Outpatient Medications   Medication Sig Dispense Refill    Ascorbic Acid (VITAMIN C) 1000 MG tablet Take 1,000 mg by mouth daily      bimatoprost (LUMIGAN) 0 03 % ophthalmic drops INSTILL ONE DROP IN BOTH EYES AT BEDTIME      cholecalciferol (VITAMIN D3) 1,000 units tablet Take by mouth      Iron Combinations (IRON COMPLEX) CAPS Take by mouth      KAITLIB FE 0 8-25 MG-MCG CHEW CHEW AND SWALLOW ONE TABLET BY MOUTH EVERY DAY 28 tablet 1    levothyroxine 25 mcg tablet TAKE ONE TABLET BY MOUTH EVERY DAY 30 tablet 5    Multiple Vitamins-Minerals (MULTIVITAMIN ADULT PO) Take by mouth      RESTASIS 0 05 % ophthalmic emulsion INSTILL ONE DROP INTO BOTH EYES EVERY 12 HOURS  6     No current facility-administered medications for this visit  Review of Systems   Constitutional: Negative for activity change, appetite change, fatigue and unexpected weight change  HENT: Negative for congestion and sore throat  Eyes: Negative for visual disturbance  Respiratory: Negative for cough, chest tightness and shortness of breath  Cardiovascular: Negative for chest pain, palpitations and leg swelling  Gastrointestinal: Negative for abdominal pain, constipation, diarrhea, nausea and vomiting  Genitourinary: Negative for difficulty urinating, dysuria, frequency and hematuria  Musculoskeletal: Negative for arthralgias, back pain, gait problem and myalgias  Skin: Negative for color change, rash and wound  Neurological: Negative for dizziness, weakness, light-headedness, numbness and headaches  Hematological: Negative for adenopathy   Does not bruise/bleed easily  Psychiatric/Behavioral: Positive for sleep disturbance  Negative for dysphoric mood  The patient is not nervous/anxious  Objective:  Vitals:    08/25/20 0824   BP: 120/80   Pulse: 64   Temp: 97 9 °F (36 6 °C)   SpO2: 98%   Weight: 124 kg (273 lb)   Height: 5' 9" (1 753 m)     Body mass index is 40 32 kg/m²  Physical Exam  Vitals signs and nursing note reviewed  Constitutional:       General: She is not in acute distress  Appearance: Normal appearance  She is well-developed  She is obese  HENT:      Head: Normocephalic and atraumatic  Right Ear: Tympanic membrane, ear canal and external ear normal       Left Ear: Tympanic membrane, ear canal and external ear normal       Nose: Nose normal       Mouth/Throat:      Pharynx: Oropharynx is clear  No oropharyngeal exudate  Eyes:      General:         Right eye: No discharge  Left eye: No discharge  Conjunctiva/sclera: Conjunctivae normal       Pupils: Pupils are equal, round, and reactive to light  Neck:      Musculoskeletal: Normal range of motion and neck supple  Thyroid: No thyromegaly  Cardiovascular:      Rate and Rhythm: Normal rate and regular rhythm  Heart sounds: Normal heart sounds  No murmur  Pulmonary:      Effort: Pulmonary effort is normal  No respiratory distress  Breath sounds: Normal breath sounds  No wheezing  Abdominal:      General: Bowel sounds are normal  There is no distension  Palpations: Abdomen is soft  There is no mass  Tenderness: There is no abdominal tenderness  Hernia: No hernia is present  Musculoskeletal: Normal range of motion  General: No deformity  Comments: Mild crepitus b/l knees   Lymphadenopathy:      Cervical: No cervical adenopathy  Skin:     General: Skin is warm and dry  Capillary Refill: Capillary refill takes less than 2 seconds  Findings: No erythema or rash     Neurological:      Mental Status: She is alert and oriented to person, place, and time  Cranial Nerves: No cranial nerve deficit  Sensory: No sensory deficit  Motor: No abnormal muscle tone  Psychiatric:         Behavior: Behavior normal          Thought Content:  Thought content normal          Judgment: Judgment normal

## 2020-09-21 ENCOUNTER — APPOINTMENT (OUTPATIENT)
Dept: LAB | Facility: CLINIC | Age: 45
End: 2020-09-21
Payer: COMMERCIAL

## 2020-09-21 DIAGNOSIS — E03.9 ACQUIRED HYPOTHYROIDISM: ICD-10-CM

## 2020-09-21 DIAGNOSIS — Z13.6 SCREENING FOR CARDIOVASCULAR CONDITION: ICD-10-CM

## 2020-09-21 LAB
CHOLEST SERPL-MCNC: 173 MG/DL (ref 50–200)
HDLC SERPL-MCNC: 58 MG/DL
LDLC SERPL CALC-MCNC: 96 MG/DL (ref 0–100)
NONHDLC SERPL-MCNC: 115 MG/DL
T4 FREE SERPL-MCNC: 0.96 NG/DL (ref 0.76–1.46)
TRIGL SERPL-MCNC: 94 MG/DL
TSH SERPL DL<=0.05 MIU/L-ACNC: 5.26 UIU/ML (ref 0.36–3.74)

## 2020-09-21 PROCEDURE — 36415 COLL VENOUS BLD VENIPUNCTURE: CPT

## 2020-09-21 PROCEDURE — 80061 LIPID PANEL: CPT

## 2020-09-21 PROCEDURE — 84443 ASSAY THYROID STIM HORMONE: CPT

## 2020-09-21 PROCEDURE — 84439 ASSAY OF FREE THYROXINE: CPT

## 2020-09-22 DIAGNOSIS — E03.9 ACQUIRED HYPOTHYROIDISM: Primary | ICD-10-CM

## 2020-09-22 RX ORDER — LEVOTHYROXINE SODIUM 0.05 MG/1
50 TABLET ORAL
Qty: 30 TABLET | Refills: 3 | Status: SHIPPED | OUTPATIENT
Start: 2020-09-22 | End: 2021-01-11

## 2020-09-25 DIAGNOSIS — E03.9 HYPOTHYROIDISM (ACQUIRED): Primary | ICD-10-CM

## 2020-12-28 ENCOUNTER — LAB (OUTPATIENT)
Dept: LAB | Facility: CLINIC | Age: 45
End: 2020-12-28
Payer: COMMERCIAL

## 2020-12-28 DIAGNOSIS — E03.9 HYPOTHYROIDISM (ACQUIRED): ICD-10-CM

## 2020-12-28 LAB — TSH SERPL DL<=0.05 MIU/L-ACNC: 3.24 UIU/ML (ref 0.36–3.74)

## 2020-12-28 PROCEDURE — 84443 ASSAY THYROID STIM HORMONE: CPT

## 2020-12-28 PROCEDURE — 36415 COLL VENOUS BLD VENIPUNCTURE: CPT

## 2020-12-29 ENCOUNTER — HOSPITAL ENCOUNTER (OUTPATIENT)
Dept: RADIOLOGY | Facility: MEDICAL CENTER | Age: 45
Discharge: HOME | End: 2020-12-29
Payer: COMMERCIAL

## 2020-12-29 VITALS — BODY MASS INDEX: 40.43 KG/M2 | HEIGHT: 69 IN | WEIGHT: 273 LBS

## 2020-12-29 DIAGNOSIS — Z12.31 VISIT FOR SCREENING MAMMOGRAM: ICD-10-CM

## 2020-12-29 DIAGNOSIS — Z12.31 OTHER SCREENING MAMMOGRAM: ICD-10-CM

## 2020-12-29 PROCEDURE — 77063 BREAST TOMOSYNTHESIS BI: CPT

## 2020-12-29 PROCEDURE — 77067 SCR MAMMO BI INCL CAD: CPT

## 2021-01-11 DIAGNOSIS — E03.9 ACQUIRED HYPOTHYROIDISM: ICD-10-CM

## 2021-01-11 RX ORDER — LEVOTHYROXINE SODIUM 0.05 MG/1
TABLET ORAL
Qty: 30 TABLET | Refills: 3 | Status: SHIPPED | OUTPATIENT
Start: 2021-01-11 | End: 2021-05-14

## 2021-02-01 DIAGNOSIS — Z30.41 ENCOUNTER FOR SURVEILLANCE OF CONTRACEPTIVE PILLS: ICD-10-CM

## 2021-02-01 RX ORDER — NORETHINDRONE AND ETHINYL ESTRADIOL 0.8-25(24)
KIT ORAL
Qty: 84 TABLET | Refills: 0 | Status: SHIPPED | OUTPATIENT
Start: 2021-02-01 | End: 2021-02-17 | Stop reason: SDUPTHER

## 2021-02-17 ENCOUNTER — ANNUAL EXAM (OUTPATIENT)
Dept: OBGYN CLINIC | Facility: CLINIC | Age: 46
End: 2021-02-17
Payer: COMMERCIAL

## 2021-02-17 VITALS
DIASTOLIC BLOOD PRESSURE: 80 MMHG | WEIGHT: 263 LBS | BODY MASS INDEX: 37.65 KG/M2 | SYSTOLIC BLOOD PRESSURE: 120 MMHG | HEIGHT: 70 IN

## 2021-02-17 DIAGNOSIS — Z01.419 ROUTINE GYNECOLOGICAL EXAMINATION: Primary | ICD-10-CM

## 2021-02-17 DIAGNOSIS — Z30.41 ENCOUNTER FOR SURVEILLANCE OF CONTRACEPTIVE PILLS: ICD-10-CM

## 2021-02-17 DIAGNOSIS — N63.31 UNSPECIFIED LUMP IN AXILLARY TAIL OF THE RIGHT BREAST: ICD-10-CM

## 2021-02-17 DIAGNOSIS — Z12.31 OTHER SCREENING MAMMOGRAM: ICD-10-CM

## 2021-02-17 PROCEDURE — S0612 ANNUAL GYNECOLOGICAL EXAMINA: HCPCS | Performed by: PHYSICIAN ASSISTANT

## 2021-02-17 PROCEDURE — G0145 SCR C/V CYTO,THINLAYER,RESCR: HCPCS | Performed by: PHYSICIAN ASSISTANT

## 2021-02-17 PROCEDURE — 87624 HPV HI-RISK TYP POOLED RSLT: CPT | Performed by: PHYSICIAN ASSISTANT

## 2021-02-17 RX ORDER — NORETHINDRONE AND ETHINYL ESTRADIOL AND FERROUS FUMARATE 0.8-25(24)
25 KIT ORAL DAILY
Qty: 84 TABLET | Refills: 3 | Status: SHIPPED | OUTPATIENT
Start: 2021-02-17 | End: 2022-03-02 | Stop reason: SDUPTHER

## 2021-02-17 NOTE — ASSESSMENT & PLAN NOTE
Pap guidelines reviewed  Pap with HPV done today  Will plan to continue Kaitlib OCP  Script renewed  Reviewed lump in axilla, will plan breast ultrasound into axilla to further evaluate  Reviewed avoiding excessive salt and caffeine intake as well as trying Vitamin E, Evening Primrose oil to help with pain  Return to office for annual or as needed

## 2021-02-17 NOTE — PROGRESS NOTES
Assessment/Plan   Problem List Items Addressed This Visit        Other    Routine gynecological examination - Primary     Pap guidelines reviewed  Pap with HPV done today  Will plan to continue Kaitlib OCP  Script renewed  Reviewed lump in axilla, will plan breast ultrasound into axilla to further evaluate  Reviewed avoiding excessive salt and caffeine intake as well as trying Vitamin E, Evening Primrose oil to help with pain  Return to office for annual or as needed  Relevant Orders    Liquid-based pap, screening      Other Visit Diagnoses     Encounter for surveillance of contraceptive pills        Relevant Medications    Norethin-Eth Estradiol-Fe (Kaitlib Fe) 0 8-25 MG-MCG CHEW    Other screening mammogram        Relevant Orders    Mammo screening bilateral w 3d & cad    Unspecified lump in axillary tail of the right breast        Relevant Orders    US breast right limited (diagnostic)          Subjective:     Patient ID: Sasha Major is a 50 y o  y o  female  HPI  49 yo seen for annual exam  Currently on Kaitlib chewable OCP, tolerating well would like to continue  Denies bowel or bladder issues  Patient does report lump she found in her right armpit has noticed in the past but seems to be getting more tender more often  Typically just with menses now noticing more  Denies lumps in breast, injury to area, nipple discharge or skin changes  Last mammogram: 12/29/2020 BIRADS 1: Negative  Last pap: 1/26/2017 NILM (-)HRHPV  The following portions of the patient's history were reviewed and updated as appropriate:   She  has a past medical history of Glaucoma    She   Patient Active Problem List    Diagnosis Date Noted    Routine gynecological examination 02/17/2021    Encounter for annual physical exam 08/25/2020    History of bariatric surgery 08/25/2020    Primary insomnia 08/25/2020    History of syncope 05/20/2020    Hypothyroidism 05/20/2020    Glaucoma 01/20/2016     She  has a past surgical history that includes Gastric bypass; Hernia repair; and Bariatric Surgery (12)  Her family history includes Hyperlipidemia in her maternal grandmother and mother; Hypertension in her maternal grandmother and mother; No Known Problems in her brother, daughter, father, paternal grandmother, and sister; Prostate cancer (age of onset: 48) in her maternal grandfather and paternal grandfather  She  reports that she has never smoked  She has never used smokeless tobacco  She reports current alcohol use of about 2 0 standard drinks of alcohol per week  She reports that she does not use drugs  Current Outpatient Medications   Medication Sig Dispense Refill    Ascorbic Acid (VITAMIN C) 1000 MG tablet Take 1,000 mg by mouth daily      bimatoprost (LUMIGAN) 0 03 % ophthalmic drops INSTILL ONE DROP IN BOTH EYES AT BEDTIME      cholecalciferol (VITAMIN D3) 1,000 units tablet Take by mouth      Iron Combinations (IRON COMPLEX) CAPS Take by mouth      levothyroxine 50 mcg tablet TAKE 1 TABLET BY MOUTH DAILY IN THE EARLY MORNING  30 tablet 3    Multiple Vitamins-Minerals (MULTIVITAMIN ADULT PO) Take by mouth      Norethin-Eth Estradiol-Fe (Kaitlib Fe) 0 8-25 MG-MCG CHEW Chew 25 mcg daily 84 tablet 3    RESTASIS 0 05 % ophthalmic emulsion INSTILL ONE DROP INTO BOTH EYES EVERY 12 HOURS  6     No current facility-administered medications for this visit  She has No Known Allergies       Menstrual History:  OB History        1    Para   1    Term   1            AB        Living   1       SAB        TAB        Ectopic        Multiple        Live Births   1                  Patient's last menstrual period was 2021 (approximate)  Review of Systems   Constitutional: Negative for fatigue, fever and unexpected weight change  HENT: Negative for dental problem and sinus pressure  Eyes: Negative for visual disturbance     Respiratory: Negative for cough, shortness of breath and wheezing  Cardiovascular: Negative for chest pain  Gastrointestinal: Negative for abdominal pain, blood in stool, constipation, diarrhea, nausea and vomiting  Endocrine: Negative for polydipsia  Genitourinary: Negative for difficulty urinating, dyspareunia, dysuria, frequency, hematuria, pelvic pain and urgency  Musculoskeletal: Negative for arthralgias and back pain  Neurological: Negative for dizziness, seizures, light-headedness and headaches  Psychiatric/Behavioral: Negative for suicidal ideas  The patient is not nervous/anxious  Objective:  Vitals:    02/17/21 0727   BP: 120/80   BP Location: Left arm   Patient Position: Sitting   Cuff Size: Large   Weight: 119 kg (263 lb)   Height: 5' 9 5" (1 765 m)      Physical Exam  Constitutional:       Appearance: Normal appearance  She is well-developed  Genitourinary:      Pelvic exam was performed with patient supine  Vulva, urethra, bladder, vagina, uterus and rectum normal       No vulval condylomata, lesion, tenderness, ulcerations, Bartholin's cyst or rash noted  No signs of labial injury  No labial fusion  No inguinal adenopathy present in the right or left side  No urethral prolapse, pain, swelling, tenderness, caruncle, mass or diverticulum present  Bladder is not distended or tender  No signs of injury or lesions in the vagina  No vaginal discharge, erythema, tenderness or bleeding  No cervical motion tenderness, discharge or lesion  Uterus is not enlarged, tender, irregular or mobile  No uterine mass detected  No right or left adnexal mass present  Right adnexa not tender or full  Left adnexa not tender or full  Rectum:      Guaiac result negative  No rectal mass, anal fissure, tenderness, external hemorrhoid, internal hemorrhoid or abnormal anal tone  HENT:      Head: Normocephalic and atraumatic  Neck:      Thyroid: No thyromegaly     Cardiovascular:      Rate and Rhythm: Normal rate and regular rhythm  Heart sounds: Normal heart sounds  No murmur  No friction rub  No gallop  Pulmonary:      Effort: Pulmonary effort is normal  No respiratory distress  Breath sounds: Normal breath sounds  No wheezing  Chest:      Breasts: Breasts are symmetrical          Right: Normal  No swelling, bleeding, inverted nipple, mass, nipple discharge, skin change or tenderness  Left: Normal  No swelling, bleeding, inverted nipple, mass, nipple discharge, skin change or tenderness  Abdominal:      General: There is no distension  Palpations: Abdomen is soft  There is no mass  Tenderness: There is no abdominal tenderness  There is no guarding or rebound  Hernia: No hernia is present  Lymphadenopathy:      Cervical: No cervical adenopathy  Upper Body:      Right upper body: No supraclavicular, axillary or pectoral adenopathy  Left upper body: No supraclavicular, axillary or pectoral adenopathy  Lower Body: No right inguinal adenopathy  No left inguinal adenopathy  Neurological:      Mental Status: She is alert and oriented to person, place, and time  Skin:     General: Skin is warm and dry     Psychiatric:         Behavior: Behavior normal

## 2021-02-21 LAB
HPV HR 12 DNA CVX QL NAA+PROBE: NEGATIVE
HPV16 DNA CVX QL NAA+PROBE: NEGATIVE
HPV18 DNA CVX QL NAA+PROBE: NEGATIVE

## 2021-02-23 ENCOUNTER — HOSPITAL ENCOUNTER (OUTPATIENT)
Dept: ULTRASOUND IMAGING | Facility: CLINIC | Age: 46
Discharge: HOME | End: 2021-02-23
Payer: COMMERCIAL

## 2021-02-23 VITALS — BODY MASS INDEX: 37.65 KG/M2 | HEIGHT: 70 IN | WEIGHT: 263 LBS

## 2021-02-23 DIAGNOSIS — N63.31 UNSPECIFIED LUMP IN AXILLARY TAIL OF THE RIGHT BREAST: ICD-10-CM

## 2021-02-23 LAB
LAB AP GYN PRIMARY INTERPRETATION: NORMAL
LAB AP LMP: NORMAL
Lab: NORMAL

## 2021-02-23 PROCEDURE — 76642 ULTRASOUND BREAST LIMITED: CPT

## 2021-05-14 DIAGNOSIS — E03.9 ACQUIRED HYPOTHYROIDISM: ICD-10-CM

## 2021-05-14 RX ORDER — LEVOTHYROXINE SODIUM 0.05 MG/1
TABLET ORAL
Qty: 30 TABLET | Refills: 0 | Status: SHIPPED | OUTPATIENT
Start: 2021-05-14 | End: 2021-05-15

## 2021-05-15 DIAGNOSIS — E03.9 ACQUIRED HYPOTHYROIDISM: ICD-10-CM

## 2021-05-15 RX ORDER — LEVOTHYROXINE SODIUM 0.05 MG/1
TABLET ORAL
Qty: 30 TABLET | Refills: 0 | Status: SHIPPED | OUTPATIENT
Start: 2021-05-15 | End: 2021-06-11 | Stop reason: SDUPTHER

## 2021-06-11 ENCOUNTER — OFFICE VISIT (OUTPATIENT)
Dept: FAMILY MEDICINE CLINIC | Facility: CLINIC | Age: 46
End: 2021-06-11
Payer: COMMERCIAL

## 2021-06-11 VITALS
WEIGHT: 255 LBS | HEART RATE: 68 BPM | HEIGHT: 70 IN | DIASTOLIC BLOOD PRESSURE: 76 MMHG | RESPIRATION RATE: 16 BRPM | OXYGEN SATURATION: 99 % | SYSTOLIC BLOOD PRESSURE: 112 MMHG | BODY MASS INDEX: 36.51 KG/M2 | TEMPERATURE: 97.8 F

## 2021-06-11 DIAGNOSIS — E03.9 ACQUIRED HYPOTHYROIDISM: Primary | ICD-10-CM

## 2021-06-11 PROCEDURE — 1036F TOBACCO NON-USER: CPT | Performed by: FAMILY MEDICINE

## 2021-06-11 PROCEDURE — 3008F BODY MASS INDEX DOCD: CPT | Performed by: FAMILY MEDICINE

## 2021-06-11 PROCEDURE — 3725F SCREEN DEPRESSION PERFORMED: CPT | Performed by: FAMILY MEDICINE

## 2021-06-11 PROCEDURE — 99213 OFFICE O/P EST LOW 20 MIN: CPT | Performed by: FAMILY MEDICINE

## 2021-06-11 RX ORDER — LEVOTHYROXINE SODIUM 0.05 MG/1
50 TABLET ORAL
Qty: 90 TABLET | Refills: 2 | Status: SHIPPED | OUTPATIENT
Start: 2021-06-11 | End: 2022-03-07

## 2021-06-11 NOTE — PROGRESS NOTES
BMI Counseling: Body mass index is 37 12 kg/m²  The BMI is above normal  Nutrition recommendations include decreasing portion sizes, encouraging healthy choices of fruits and vegetables, consuming healthier snacks and moderation in carbohydrate intake  Exercise recommendations include exercising 3-5 times per week  No pharmacotherapy was ordered  Assessment/Plan:         Problem List Items Addressed This Visit        Endocrine    Hypothyroidism - Primary     Recheck TSH and Free T4  Continue levothyroxine 50 mcg qd  Subjective:      Patient ID: Guzman Kruger is a 48 y o  female  Pt here for f/u hypothyroidism  Doing well  No cp/sob  No headaches  No abd pain  Pt exercises and energy level good  No pain or new c/o  Had COVID 1 mth ago and completely better now  No sob, fatigue, or mental fog  The following portions of the patient's history were reviewed and updated as appropriate:   Past Medical History:  She has a past medical history of Glaucoma  ,  _______________________________________________________________________  Medical Problems:  does not have any pertinent problems on file ,  _______________________________________________________________________  Past Surgical History:   has a past surgical history that includes Gastric bypass; Hernia repair; and Bariatric Surgery (12/18/12)  ,  _______________________________________________________________________  Family History:  family history includes Hyperlipidemia in her maternal grandmother and mother; Hypertension in her maternal grandmother and mother; No Known Problems in her brother, daughter, father, paternal grandmother, and sister; Prostate cancer (age of onset: 48) in her maternal grandfather and paternal grandfather ,  _______________________________________________________________________  Social History:   reports that she has never smoked   She has never used smokeless tobacco  She reports current alcohol use of about 2 0 standard drinks of alcohol per week  She reports that she does not use drugs  ,  _______________________________________________________________________  Allergies:  has No Known Allergies     _______________________________________________________________________  Current Outpatient Medications   Medication Sig Dispense Refill    Ascorbic Acid (VITAMIN C) 1000 MG tablet Take 1,000 mg by mouth daily      bimatoprost (LUMIGAN) 0 03 % ophthalmic drops INSTILL ONE DROP IN BOTH EYES AT BEDTIME      cholecalciferol (VITAMIN D3) 1,000 units tablet Take by mouth      Iron Combinations (IRON COMPLEX) CAPS Take by mouth      levothyroxine 50 mcg tablet TAKE 1 TABLET BY MOUTH DAILY IN THE EARLY MORNING 30 tablet 0    Multiple Vitamins-Minerals (MULTIVITAMIN ADULT PO) Take by mouth      Norethin-Eth Estradiol-Fe (Kaitlib Fe) 0 8-25 MG-MCG CHEW Chew 25 mcg daily 84 tablet 3    RESTASIS 0 05 % ophthalmic emulsion INSTILL ONE DROP INTO BOTH EYES EVERY 12 HOURS  6     No current facility-administered medications for this visit       _______________________________________________________________________  Review of Systems   Constitutional: Negative for fatigue and unexpected weight change  Respiratory: Negative for cough, chest tightness and shortness of breath  Cardiovascular: Negative for chest pain and palpitations  Gastrointestinal: Negative for abdominal pain, constipation, diarrhea, nausea and vomiting  Genitourinary: Negative for difficulty urinating  Musculoskeletal: Negative for arthralgias  Skin: Negative for rash  Neurological: Negative for dizziness and headaches  Objective:  Vitals:    06/11/21 1035   BP: 112/76   BP Location: Left arm   Patient Position: Sitting   Pulse: 68   Resp: 16   Temp: 97 8 °F (36 6 °C)   SpO2: 99%   Weight: 116 kg (255 lb)   Height: 5' 9 5" (1 765 m)     Body mass index is 37 12 kg/m²  Physical Exam  Vitals signs and nursing note reviewed     Constitutional: Appearance: Normal appearance  She is well-developed  She is obese  HENT:      Head: Normocephalic and atraumatic  Neck:      Musculoskeletal: Normal range of motion and neck supple  Cardiovascular:      Rate and Rhythm: Normal rate and regular rhythm  Heart sounds: Normal heart sounds  No murmur  Pulmonary:      Effort: Pulmonary effort is normal  No respiratory distress  Breath sounds: Normal breath sounds  No wheezing  Musculoskeletal:      Right lower leg: No edema  Left lower leg: No edema  Lymphadenopathy:      Cervical: No cervical adenopathy  Neurological:      Mental Status: She is alert and oriented to person, place, and time  Psychiatric:         Mood and Affect: Mood normal          Behavior: Behavior normal          Thought Content:  Thought content normal          Judgment: Judgment normal

## 2021-06-14 ENCOUNTER — TRANSCRIBE ORDERS (OUTPATIENT)
Dept: LAB | Facility: CLINIC | Age: 46
End: 2021-06-14

## 2021-06-14 ENCOUNTER — APPOINTMENT (OUTPATIENT)
Dept: LAB | Facility: CLINIC | Age: 46
End: 2021-06-14
Payer: COMMERCIAL

## 2021-06-14 DIAGNOSIS — E03.9 ACQUIRED HYPOTHYROIDISM: ICD-10-CM

## 2021-06-14 LAB
T4 FREE SERPL-MCNC: 0.98 NG/DL (ref 0.76–1.46)
TSH SERPL DL<=0.05 MIU/L-ACNC: 2.29 UIU/ML (ref 0.36–3.74)

## 2021-06-14 PROCEDURE — 84439 ASSAY OF FREE THYROXINE: CPT

## 2021-06-14 PROCEDURE — 84443 ASSAY THYROID STIM HORMONE: CPT

## 2021-06-14 PROCEDURE — 36415 COLL VENOUS BLD VENIPUNCTURE: CPT

## 2021-06-30 ENCOUNTER — OFFICE VISIT (OUTPATIENT)
Dept: FAMILY MEDICINE CLINIC | Facility: CLINIC | Age: 46
End: 2021-06-30
Payer: COMMERCIAL

## 2021-06-30 ENCOUNTER — HOSPITAL ENCOUNTER (OUTPATIENT)
Dept: RADIOLOGY | Facility: HOSPITAL | Age: 46
Discharge: HOME | End: 2021-06-30
Attending: FAMILY MEDICINE
Payer: COMMERCIAL

## 2021-06-30 VITALS
HEART RATE: 70 BPM | WEIGHT: 256 LBS | HEIGHT: 70 IN | BODY MASS INDEX: 36.65 KG/M2 | TEMPERATURE: 97.7 F | RESPIRATION RATE: 16 BRPM | DIASTOLIC BLOOD PRESSURE: 88 MMHG | SYSTOLIC BLOOD PRESSURE: 116 MMHG | OXYGEN SATURATION: 97 %
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
## 2021-06-30 DIAGNOSIS — M25.532 LEFT WRIST PAIN: ICD-10-CM

## 2021-06-30 DIAGNOSIS — M79.642 HAND PAIN, LEFT: Primary | ICD-10-CM

## 2021-06-30 DIAGNOSIS — M79.642 HAND PAIN, LEFT: ICD-10-CM

## 2021-06-30 PROCEDURE — 1036F TOBACCO NON-USER: CPT | Performed by: FAMILY MEDICINE

## 2021-06-30 PROCEDURE — 3008F BODY MASS INDEX DOCD: CPT | Performed by: FAMILY MEDICINE

## 2021-06-30 PROCEDURE — 73130 X-RAY EXAM OF HAND: CPT

## 2021-06-30 PROCEDURE — 73110 X-RAY EXAM OF WRIST: CPT

## 2021-06-30 PROCEDURE — 99213 OFFICE O/P EST LOW 20 MIN: CPT | Performed by: FAMILY MEDICINE

## 2021-06-30 NOTE — PROGRESS NOTES
Assessment/Plan:    No problem-specific Assessment & Plan notes found for this encounter  Diagnoses and all orders for this visit:    Hand pain, left  -     XR hand 3+ vw left; Future    Left wrist pain  -     XR wrist 3+ vw left; Future    Fall, initial encounter    Other orders  -     VITAMIN A PO; Take 3,000 Units by mouth  Suspect strain/sprain injury but will check xray of wrist and hand to rule out fracture  Continue ice, tylenol for pain  Subjective:      Patient ID: Bertha Montes is a 48 y  o right hand dominant female who is here today for evaluation of left wrist and finger pain  Started after a fall last week  She tripped over a curb, landing on left outstretched hand  Having pain in left wrist/hand as well as her 4th and 5th fingers  Some swelling  Icing hand and taking tylenol  HPI    The following portions of the patient's history were reviewed and updated as appropriate: She  has a past medical history of Glaucoma and Hypothyroidism  She  has a past surgical history that includes Gastric bypass; Hernia repair; and Bariatric Surgery (12/18/12)  She  reports that she has never smoked  She has never used smokeless tobacco  She reports current alcohol use of about 2 0 standard drinks of alcohol per week  She reports that she does not use drugs    Current Outpatient Medications on File Prior to Visit   Medication Sig    Ascorbic Acid (VITAMIN C) 1000 MG tablet Take 1,000 mg by mouth daily    bimatoprost (LUMIGAN) 0 03 % ophthalmic drops INSTILL ONE DROP IN BOTH EYES AT BEDTIME    cholecalciferol (VITAMIN D3) 1,000 units tablet Take by mouth    Iron Combinations (IRON COMPLEX) CAPS Take by mouth    levothyroxine 50 mcg tablet Take 1 tablet (50 mcg total) by mouth daily in the early morning    Multiple Vitamins-Minerals (MULTIVITAMIN ADULT PO) Take by mouth    Norethin-Eth Estradiol-Fe (Kaitlib Fe) 0 8-25 MG-MCG CHEW Chew 25 mcg daily    RESTASIS 0 05 % ophthalmic emulsion INSTILL ONE DROP INTO BOTH EYES EVERY 12 HOURS    VITAMIN A PO Take 3,000 Units by mouth     No current facility-administered medications on file prior to visit  She has No Known Allergies       Review of Systems      Objective:      /88 (BP Location: Left arm, Patient Position: Sitting, Cuff Size: Large)   Pulse 70   Temp 97 7 °F (36 5 °C) (Temporal)   Resp 16   Ht 5' 9 5" (1 765 m)   Wt 116 kg (256 lb)   SpO2 97%   BMI 37 26 kg/m²          Physical Exam  Vitals and nursing note reviewed  Constitutional:       General: She is not in acute distress  Appearance: Normal appearance  She is obese  She is not ill-appearing, toxic-appearing or diaphoretic  HENT:      Head: Normocephalic and atraumatic  Cardiovascular:      Rate and Rhythm: Normal rate and regular rhythm  Pulmonary:      Effort: Pulmonary effort is normal       Breath sounds: Normal breath sounds  Musculoskeletal:      Comments: Left wrist with full range of motion  There is no tenderness or swelling of wrist    She has point tenderness over base of 5th metacarpal    Some tenderness over mid 4th metacarpal    Fingers with no deformity or swelling    Abrasion to palmar surface left hand   Neurological:      Mental Status: She is alert

## 2021-08-30 ENCOUNTER — TELEPHONE (OUTPATIENT)
Dept: FAMILY MEDICINE CLINIC | Facility: CLINIC | Age: 46
End: 2021-08-30

## 2021-08-30 NOTE — TELEPHONE ENCOUNTER
Pt is volunteering for the high school and would like a letter stating she is low risk of tuberculosis

## 2021-08-31 NOTE — TELEPHONE ENCOUNTER
They do not require it because she will be working less then 10 hours a week  Wrote note for patient- at  for pickup patient aware

## 2021-12-03 ENCOUNTER — RA CDI HCC (OUTPATIENT)
Dept: OTHER | Facility: HOSPITAL | Age: 46
End: 2021-12-03

## 2021-12-10 ENCOUNTER — OFFICE VISIT (OUTPATIENT)
Dept: FAMILY MEDICINE CLINIC | Facility: CLINIC | Age: 46
End: 2021-12-10
Payer: COMMERCIAL

## 2021-12-10 VITALS
HEART RATE: 75 BPM | BODY MASS INDEX: 37.37 KG/M2 | DIASTOLIC BLOOD PRESSURE: 84 MMHG | WEIGHT: 261 LBS | HEIGHT: 70 IN | SYSTOLIC BLOOD PRESSURE: 130 MMHG | RESPIRATION RATE: 16 BRPM | OXYGEN SATURATION: 97 % | TEMPERATURE: 97.2 F

## 2021-12-10 DIAGNOSIS — Z13.1 SCREENING FOR DIABETES MELLITUS: ICD-10-CM

## 2021-12-10 DIAGNOSIS — E03.9 ACQUIRED HYPOTHYROIDISM: Primary | ICD-10-CM

## 2021-12-10 DIAGNOSIS — E66.09 CLASS 2 OBESITY DUE TO EXCESS CALORIES WITHOUT SERIOUS COMORBIDITY WITH BODY MASS INDEX (BMI) OF 37.0 TO 37.9 IN ADULT: ICD-10-CM

## 2021-12-10 DIAGNOSIS — Z13.6 SCREENING FOR CARDIOVASCULAR CONDITION: ICD-10-CM

## 2021-12-10 PROBLEM — E66.812 CLASS 2 OBESITY DUE TO EXCESS CALORIES WITHOUT SERIOUS COMORBIDITY WITH BODY MASS INDEX (BMI) OF 37.0 TO 37.9 IN ADULT: Status: ACTIVE | Noted: 2021-12-10

## 2021-12-10 PROCEDURE — 3725F SCREEN DEPRESSION PERFORMED: CPT | Performed by: FAMILY MEDICINE

## 2021-12-10 PROCEDURE — 3008F BODY MASS INDEX DOCD: CPT | Performed by: FAMILY MEDICINE

## 2021-12-10 PROCEDURE — 99213 OFFICE O/P EST LOW 20 MIN: CPT | Performed by: FAMILY MEDICINE

## 2021-12-10 PROCEDURE — 1036F TOBACCO NON-USER: CPT | Performed by: FAMILY MEDICINE

## 2021-12-29 ENCOUNTER — APPOINTMENT (OUTPATIENT)
Dept: LAB | Facility: CLINIC | Age: 46
End: 2021-12-29
Payer: COMMERCIAL

## 2021-12-29 DIAGNOSIS — Z13.1 SCREENING FOR DIABETES MELLITUS: ICD-10-CM

## 2021-12-29 DIAGNOSIS — E03.9 ACQUIRED HYPOTHYROIDISM: ICD-10-CM

## 2021-12-29 DIAGNOSIS — Z13.6 SCREENING FOR CARDIOVASCULAR CONDITION: ICD-10-CM

## 2021-12-29 LAB
CHOLEST SERPL-MCNC: 201 MG/DL
GLUCOSE P FAST SERPL-MCNC: 81 MG/DL (ref 65–99)
HDLC SERPL-MCNC: 69 MG/DL
LDLC SERPL CALC-MCNC: 118 MG/DL (ref 0–100)
NONHDLC SERPL-MCNC: 132 MG/DL
T4 FREE SERPL-MCNC: 0.95 NG/DL (ref 0.76–1.46)
TRIGL SERPL-MCNC: 69 MG/DL
TSH SERPL DL<=0.05 MIU/L-ACNC: 4.25 UIU/ML (ref 0.36–3.74)

## 2021-12-29 PROCEDURE — 36415 COLL VENOUS BLD VENIPUNCTURE: CPT

## 2021-12-29 PROCEDURE — 80061 LIPID PANEL: CPT

## 2021-12-29 PROCEDURE — 84439 ASSAY OF FREE THYROXINE: CPT

## 2021-12-29 PROCEDURE — 82947 ASSAY GLUCOSE BLOOD QUANT: CPT

## 2021-12-29 PROCEDURE — 84443 ASSAY THYROID STIM HORMONE: CPT

## 2021-12-30 ENCOUNTER — HOSPITAL ENCOUNTER (OUTPATIENT)
Dept: RADIOLOGY | Age: 46
Discharge: HOME | End: 2021-12-30
Payer: COMMERCIAL

## 2021-12-30 VITALS — WEIGHT: 260 LBS | HEIGHT: 70 IN | BODY MASS INDEX: 37.22 KG/M2

## 2021-12-30 DIAGNOSIS — Z12.31 OTHER SCREENING MAMMOGRAM: ICD-10-CM

## 2021-12-30 PROCEDURE — 77067 SCR MAMMO BI INCL CAD: CPT

## 2021-12-30 PROCEDURE — 77063 BREAST TOMOSYNTHESIS BI: CPT

## 2022-03-01 NOTE — PROGRESS NOTES
Assessment/Plan   Diagnoses and all orders for this visit:    Routine gynecological examination    Encounter for surveillance of contraceptive pills  -     Norethin-Eth Estradiol-Fe (Kaitlib Fe) 0 8-25 MG-MCG CHEW; Chew 25 mcg daily    Encounter for screening mammogram for malignant neoplasm of breast  -     Mammo screening bilateral w 3d & cad; Future        Discussion    All questions have been answered to her satisfaction  RTO for APE or sooner if needed  Pap deferred  Will continue to watch cycles and sx and see how things progress  She may plan OCP restart, but is yet undecided  Subjective     HPI   Jose Carlos Diaz is a 48 y o  female who presents for annual well woman exam    LMP -2/17/22 ; Periods good on OCPs  Periods last 3 days, tolerable  Pt was due to restart her OCPs approx 1 5 weeks ago  She decided to try a bit off of her pills for a bit and see how her periods and perimenopausal sx respond  Thus far she has not had irregular bleeding and her night sweats have not changed or worsened  No vulvar itch/burn; No vaginal itch/burn; No abn discharge or odor; No urinary sx - burning/pain/frequency/hematuria    (+) SBEs - no breast masses, asymmetry, nipple discharge or bleeding, changes in skin of breast, or breast tenderness bilaterally    No abd/pelvic pain or HAs; Few night sweats, tolerable  Pt is sexually active in a mutually monog/ sexual relationship; No issues with intercourse; She declines sti/hiv/hep testing; Feels safe at home    Condom use:  (+) PCP for routine Bw/care; Last Pap - 2/17/21 WNL neg HPV   History of abnormal Pap smear: denies   Last mammo - 12/31/21 BIRADS 2   History of abnormal mammogram: denies   Last colonoscopy - denies       Review of Systems   Constitutional: Negative  Respiratory: Negative  Gastrointestinal: Negative  Endocrine: Negative  Genitourinary: Negative          The following portions of the patient's history were reviewed and updated as appropriate: allergies, current medications, past family history, past medical history, past social history, past surgical history and problem list          OB History        1    Para   1    Term   1            AB        Living   1       SAB        IAB        Ectopic        Multiple        Live Births   1                 Past Medical History:   Diagnosis Date    Glaucoma     Hypothyroidism        Past Surgical History:   Procedure Laterality Date    BARIATRIC SURGERY  12    GASTRIC BYPASS      surgery for morbid obesity 2012    HERNIA REPAIR      incisional       Family History   Problem Relation Age of Onset    Hypertension Mother         benign essential    Hyperlipidemia Mother     Hypertension Maternal Grandmother         benign essential    Hyperlipidemia Maternal Grandmother     Stomach cancer Maternal Grandmother     No Known Problems Father     No Known Problems Sister     No Known Problems Daughter     Prostate cancer Maternal Grandfather 48    No Known Problems Paternal Grandmother     Prostate cancer Paternal Grandfather 48    No Known Problems Brother        Social History     Socioeconomic History    Marital status: Single     Spouse name: Not on file    Number of children: Not on file    Years of education: Not on file    Highest education level: Not on file   Occupational History    Occupation:    Tobacco Use    Smoking status: Never Smoker    Smokeless tobacco: Never Used   Vaping Use    Vaping Use: Never used   Substance and Sexual Activity    Alcohol use:  Yes     Alcohol/week: 2 0 standard drinks     Types: 2 Standard drinks or equivalent per week    Drug use: No    Sexual activity: Yes     Partners: Male     Birth control/protection: Other     Comment: Pill   Other Topics Concern    Not on file   Social History Narrative    Non  -White    Exercises regularly     as per [facility]    Primary language - English     Social Determinants of Health     Financial Resource Strain: Not on file   Food Insecurity: Not on file   Transportation Needs: Not on file   Physical Activity: Not on file   Stress: Not on file   Social Connections: Not on file   Intimate Partner Violence: Not on file   Housing Stability: Not on file         Current Outpatient Medications:     Ascorbic Acid (VITAMIN C) 1000 MG tablet, Take 1,000 mg by mouth daily, Disp: , Rfl:     bimatoprost (LUMIGAN) 0 03 % ophthalmic drops, INSTILL ONE DROP IN BOTH EYES AT BEDTIME, Disp: , Rfl:     cholecalciferol (VITAMIN D3) 1,000 units tablet, Take by mouth, Disp: , Rfl:     Iron Combinations (IRON COMPLEX) CAPS, Take by mouth, Disp: , Rfl:     levothyroxine 50 mcg tablet, Take 1 tablet (50 mcg total) by mouth daily in the early morning, Disp: 90 tablet, Rfl: 2    Multiple Vitamins-Minerals (MULTIVITAMIN ADULT PO), Take by mouth, Disp: , Rfl:     Norethin-Eth Estradiol-Fe (Kaitlib Fe) 0 8-25 MG-MCG CHEW, Chew 25 mcg daily, Disp: 84 tablet, Rfl: 3    RESTASIS 0 05 % ophthalmic emulsion, INSTILL ONE DROP INTO BOTH EYES EVERY 12 HOURS, Disp: , Rfl: 6    VITAMIN A PO, Take 3,000 Units by mouth, Disp: , Rfl:     No Known Allergies    Objective   Vitals:    03/02/22 0802   BP: 124/82   BP Location: Left arm   Patient Position: Sitting   Cuff Size: Large   Weight: 118 kg (261 lb)   Height: 5' 9 5" (1 765 m)     Physical Exam  Vitals reviewed  Constitutional:       Appearance: She is well-developed  HENT:      Head: Normocephalic and atraumatic  Cardiovascular:      Rate and Rhythm: Normal rate and regular rhythm  Pulmonary:      Effort: Pulmonary effort is normal       Breath sounds: Normal breath sounds  Chest:   Breasts: Breasts are symmetrical       Right: No inverted nipple, mass, nipple discharge, skin change or tenderness  Left: No inverted nipple, mass, nipple discharge, skin change or tenderness         Abdominal:      General: There is no distension  Palpations: Abdomen is soft  There is no mass  Tenderness: There is no guarding or rebound  Genitourinary:     Exam position: Supine  Labia:         Right: No rash  Left: No rash  Vagina: No signs of injury and foreign body  No vaginal discharge or erythema  Cervix: No cervical motion tenderness  Adnexa:         Right: No mass  Left: No mass  Rectum: Guaiac result negative  Skin:     General: Skin is warm and dry  Neurological:      Mental Status: She is alert and oriented to person, place, and time  Patient Instructions     Menopause   AMBULATORY CARE:   Menopause  is a normal stage in a woman's life when her monthly periods stop  You are considered to be in menopause when you have not had a period for a full year after the age of 36  Menopause usually occurs between ages 52 to 48  Perimenopause is a stage before menopause that may cause signs and symptoms similar to menopause  Perimenopause may start about 4 years before menopause  What causes menopause:  Menopause starts when the ovaries stop making the female hormones estrogen and progesterone  After menopause, you are no longer able to become pregnant  Any of the following may trigger menopause or early menopause:  · Surgery, including a hysterectomy or oophorectomy    · Family history of early menopause    · Smoking    · Chemotherapy or pelvic radiation    · Chromosome abnormalities, including Kelley syndrome and Fragile X syndrome    · Premature ovarian insufficiency (the ovaries stop producing eggs before age 36)    Common symptoms include the following:   The signs and symptoms of menopause can be different from woman to woman:  · Irregular menstrual cycles with heavy vaginal bleeding followed by decreased bleeding until it stops    · Hot flashes (feeling warm, flushed, and sweaty)    · Vaginal changes such as increased dryness    · Mood changes such as anxiety, depression, or decreased desire to have sex    · Trouble sleeping, joint pain, headaches    · Brittle nails, hair on chin or chest where it is normally absent    · Decrease in breast size and change in skin texture    · Weight gain    Call your doctor or gynecologist if:   · You have vaginal bleeding after menopause  · You have questions or concerns about your condition or care  Treat or manage menopause symptoms:   · Hormone replacement therapy (HRT) is medicine that replaces your low hormone levels  HRT contains estrogen and sometimes progestin  HRT has benefits and risks:     ? HRT has several benefits  HRT helps prevent osteoporosis, which decreases your risk for bone fractures  HRT also protects you from colorectal cancer  ? HRT also has some risks  HRT increases your risk for breast cancer, blood clots, heart disease, a heart attack, or a stroke  If you are 72 years or older, HRT can also increase your risk for dementia  Your risk for uterine or endometrial cancer is higher if you take estrogen-only HRT  · Manage hot flashes  Hot flashes are brief periods of feeling very warm, flushed, and sweaty  Hot flashes can last from a few seconds to several minutes  They may happen many times during the day, and are common at night  Layer your clothing so that you can easily remove some clothing and cool yourself during a hot flash  Cold drinks may also be helpful  Non-hormone medicines can help relieve or prevent hot flashes  Examples include certain antidepressants, nerve medicines, and high blood pressure medicines  · Reduce vaginal dryness by using over-the-counter vaginal creams  Vaginal dryness may cause you to have pain or discomfort during sex  Only use creams that are made for vaginal use  Do  not  use petroleum jelly  You may put an estrogen cream in and around your vagina  Estrogen cream may help decrease vaginal dryness and lower your risk of vaginal infections      · Continue to use birth control during perimenopause if you do not want to get pregnant  You may need to use birth control until it has been 1 year since your periods stopped  Ask your healthcare provider when you can stop using birth control to prevent pregnancy  Lead a healthy lifestyle:  After menopause, your risk for heart disease and bone loss increases  Ask about these and other ways to stay healthy:  · Exercise regularly  Exercise helps you maintain a healthy weight  Exercise can also help to control your blood pressure and cholesterol levels  Include weight-bearing exercise for strong bones  Weight bearing exercise is recommended for at least 30 minutes, 3 times a week  Ask your healthcare provider about the best exercise plan for you  · Eat a variety of healthy foods  Include fruits, vegetables, whole grains (whole-wheat bread, pasta, and cereals), low-fat dairy, and lean protein foods (beans, poultry, and fish)  Limit foods high in sodium (salt)  Ask your healthcare provider for more information about a meal plan that is right for you  · Do not smoke  If you smoke, it is never too late to quit  You are more likely to have a heart attack, lung disease, blood clots, and cancer if you smoke  Ask your healthcare provider for information if you need help quitting  · Take supplements as directed  You may need extra calcium and vitamin D to help prevent osteoporosis  · Limit alcohol and caffeine  Alcohol and caffeine may worsen your symptoms  Follow up with your doctor as directed:  Write down your questions so you remember to ask them during your visits  © Copyright [employer] 2022 Information is for End User's use only and may not be sold, redistributed or otherwise used for commercial purposes  All illustrations and images included in CareNotes® are the copyrighted property of [employer]  or Romaine Adams   The above information is an  only   It is not intended as medical advice for individual conditions or treatments  Talk to your doctor, nurse or pharmacist before following any medical regimen to see if it is safe and effective for you

## 2022-03-02 ENCOUNTER — OFFICE VISIT (OUTPATIENT)
Dept: OBGYN CLINIC | Facility: CLINIC | Age: 47
End: 2022-03-02
Payer: COMMERCIAL

## 2022-03-02 VITALS
WEIGHT: 261 LBS | HEIGHT: 70 IN | SYSTOLIC BLOOD PRESSURE: 124 MMHG | DIASTOLIC BLOOD PRESSURE: 82 MMHG | BODY MASS INDEX: 37.37 KG/M2

## 2022-03-02 DIAGNOSIS — Z30.41 ENCOUNTER FOR SURVEILLANCE OF CONTRACEPTIVE PILLS: ICD-10-CM

## 2022-03-02 DIAGNOSIS — Z12.31 ENCOUNTER FOR SCREENING MAMMOGRAM FOR MALIGNANT NEOPLASM OF BREAST: ICD-10-CM

## 2022-03-02 DIAGNOSIS — Z01.419 ROUTINE GYNECOLOGICAL EXAMINATION: Primary | ICD-10-CM

## 2022-03-02 PROCEDURE — S0612 ANNUAL GYNECOLOGICAL EXAMINA: HCPCS | Performed by: PHYSICIAN ASSISTANT

## 2022-03-02 RX ORDER — NORETHINDRONE AND ETHINYL ESTRADIOL AND FERROUS FUMARATE 0.8-25(24)
25 KIT ORAL DAILY
Qty: 84 TABLET | Refills: 3 | Status: SHIPPED | OUTPATIENT
Start: 2022-03-02

## 2022-03-02 NOTE — PATIENT INSTRUCTIONS
Menopause   AMBULATORY CARE:   Menopause  is a normal stage in a woman's life when her monthly periods stop  You are considered to be in menopause when you have not had a period for a full year after the age of 36  Menopause usually occurs between ages 52 to 48  Perimenopause is a stage before menopause that may cause signs and symptoms similar to menopause  Perimenopause may start about 4 years before menopause  What causes menopause:  Menopause starts when the ovaries stop making the female hormones estrogen and progesterone  After menopause, you are no longer able to become pregnant  Any of the following may trigger menopause or early menopause:  · Surgery, including a hysterectomy or oophorectomy    · Family history of early menopause    · Smoking    · Chemotherapy or pelvic radiation    · Chromosome abnormalities, including Kelley syndrome and Fragile X syndrome    · Premature ovarian insufficiency (the ovaries stop producing eggs before age 36)    Common symptoms include the following: The signs and symptoms of menopause can be different from woman to woman:  · Irregular menstrual cycles with heavy vaginal bleeding followed by decreased bleeding until it stops    · Hot flashes (feeling warm, flushed, and sweaty)    · Vaginal changes such as increased dryness    · Mood changes such as anxiety, depression, or decreased desire to have sex    · Trouble sleeping, joint pain, headaches    · Brittle nails, hair on chin or chest where it is normally absent    · Decrease in breast size and change in skin texture    · Weight gain    Call your doctor or gynecologist if:   · You have vaginal bleeding after menopause  · You have questions or concerns about your condition or care  Treat or manage menopause symptoms:   · Hormone replacement therapy (HRT) is medicine that replaces your low hormone levels  HRT contains estrogen and sometimes progestin  HRT has benefits and risks:     ? HRT has several benefits  HRT helps prevent osteoporosis, which decreases your risk for bone fractures  HRT also protects you from colorectal cancer  ? HRT also has some risks  HRT increases your risk for breast cancer, blood clots, heart disease, a heart attack, or a stroke  If you are 72 years or older, HRT can also increase your risk for dementia  Your risk for uterine or endometrial cancer is higher if you take estrogen-only HRT  · Manage hot flashes  Hot flashes are brief periods of feeling very warm, flushed, and sweaty  Hot flashes can last from a few seconds to several minutes  They may happen many times during the day, and are common at night  Layer your clothing so that you can easily remove some clothing and cool yourself during a hot flash  Cold drinks may also be helpful  Non-hormone medicines can help relieve or prevent hot flashes  Examples include certain antidepressants, nerve medicines, and high blood pressure medicines  · Reduce vaginal dryness by using over-the-counter vaginal creams  Vaginal dryness may cause you to have pain or discomfort during sex  Only use creams that are made for vaginal use  Do  not  use petroleum jelly  You may put an estrogen cream in and around your vagina  Estrogen cream may help decrease vaginal dryness and lower your risk of vaginal infections  · Continue to use birth control during perimenopause if you do not want to get pregnant  You may need to use birth control until it has been 1 year since your periods stopped  Ask your healthcare provider when you can stop using birth control to prevent pregnancy  Lead a healthy lifestyle:  After menopause, your risk for heart disease and bone loss increases  Ask about these and other ways to stay healthy:  · Exercise regularly  Exercise helps you maintain a healthy weight  Exercise can also help to control your blood pressure and cholesterol levels  Include weight-bearing exercise for strong bones   Weight bearing exercise is recommended for at least 30 minutes, 3 times a week  Ask your healthcare provider about the best exercise plan for you  · Eat a variety of healthy foods  Include fruits, vegetables, whole grains (whole-wheat bread, pasta, and cereals), low-fat dairy, and lean protein foods (beans, poultry, and fish)  Limit foods high in sodium (salt)  Ask your healthcare provider for more information about a meal plan that is right for you  · Do not smoke  If you smoke, it is never too late to quit  You are more likely to have a heart attack, lung disease, blood clots, and cancer if you smoke  Ask your healthcare provider for information if you need help quitting  · Take supplements as directed  You may need extra calcium and vitamin D to help prevent osteoporosis  · Limit alcohol and caffeine  Alcohol and caffeine may worsen your symptoms  Follow up with your doctor as directed:  Write down your questions so you remember to ask them during your visits  © Copyright [employer] 2022 Information is for End User's use only and may not be sold, redistributed or otherwise used for commercial purposes  All illustrations and images included in CareNotes® are the copyrighted property of [employer]  or [address] [place], [place] [address]sar[address]   The above information is an  only  It is not intended as medical advice for individual conditions or treatments  Talk to your doctor, nurse or pharmacist before following any medical regimen to see if it is safe and effective for you

## 2022-03-06 DIAGNOSIS — E03.9 ACQUIRED HYPOTHYROIDISM: ICD-10-CM

## 2022-03-07 RX ORDER — LEVOTHYROXINE SODIUM 0.05 MG/1
TABLET ORAL
Qty: 90 TABLET | Refills: 2 | Status: SHIPPED | OUTPATIENT
Start: 2022-03-07 | End: 2022-04-04

## 2022-04-01 DIAGNOSIS — E03.9 ACQUIRED HYPOTHYROIDISM: ICD-10-CM

## 2022-04-04 RX ORDER — LEVOTHYROXINE SODIUM 0.05 MG/1
TABLET ORAL
Qty: 30 TABLET | Refills: 0 | Status: SHIPPED | OUTPATIENT
Start: 2022-04-04

## 2022-05-13 ENCOUNTER — OFFICE VISIT (OUTPATIENT)
Dept: FAMILY MEDICINE CLINIC | Facility: CLINIC | Age: 47
End: 2022-05-13
Payer: COMMERCIAL

## 2022-05-13 VITALS
OXYGEN SATURATION: 99 % | RESPIRATION RATE: 16 BRPM | TEMPERATURE: 97 F | HEART RATE: 78 BPM | WEIGHT: 264 LBS | SYSTOLIC BLOOD PRESSURE: 120 MMHG | BODY MASS INDEX: 37.8 KG/M2 | HEIGHT: 70 IN | DIASTOLIC BLOOD PRESSURE: 80 MMHG

## 2022-05-13 DIAGNOSIS — U07.1 COVID-19 VIRUS INFECTION: Primary | ICD-10-CM

## 2022-05-13 DIAGNOSIS — E66.09 CLASS 2 OBESITY DUE TO EXCESS CALORIES WITHOUT SERIOUS COMORBIDITY WITH BODY MASS INDEX (BMI) OF 37.0 TO 37.9 IN ADULT: ICD-10-CM

## 2022-05-13 PROCEDURE — 99213 OFFICE O/P EST LOW 20 MIN: CPT | Performed by: FAMILY MEDICINE

## 2022-05-13 PROCEDURE — 1036F TOBACCO NON-USER: CPT | Performed by: FAMILY MEDICINE

## 2022-05-13 PROCEDURE — 3008F BODY MASS INDEX DOCD: CPT | Performed by: FAMILY MEDICINE

## 2022-05-13 NOTE — PROGRESS NOTES
BMI Counseling: Body mass index is 38 43 kg/m²  The BMI is above normal  Nutrition recommendations include decreasing portion sizes, encouraging healthy choices of fruits and vegetables, consuming healthier snacks and moderation in carbohydrate intake  Exercise recommendations include exercising 3-5 times per week  No pharmacotherapy was ordered  Rationale for BMI follow-up plan is due to patient being overweight or obese  Assessment/Plan:         Problem List Items Addressed This Visit        Other    COVID-19 virus infection - Primary     Pt tested positive for COVID today  Her symptoms started on 5/11/22  Pt instructed to quarantine for 7 days  Discussed MAB infusion vs Paxlovid vs symptomatic treatment  Pt wishes to try symptomatic treatment but she knows to call if symptoms worsen and can try one of the other treatment options  Pt told to increase rest and fluids  Can take tylenol or advil for fever or pain  Can take sudafed for congestion and robitussin DM for cough  Pt advised to take Vit C, Vit D, and Zinc  Call if symptoms worsen or go to ER  Class 2 obesity due to excess calories without serious comorbidity with body mass index (BMI) of 37 0 to 37 9 in adult     Discussed smaller portions, healthy snacks, and regular exercise  Subjective:      Patient ID: Brianna Laurent is a 50 y o  female  Pt here for 2 day hx of sinus congestion and pressure, HA, cough  No sore throat  No fever or chills  Feels tired  No fatigue  No wheezing or sob  No n/v/d  Pt took home COVID test today and was positive  Also, had it last year  No sick contacts  Taking dayquil  Hasn't had COVID vaccines  The following portions of the patient's history were reviewed and updated as appropriate:   Past Medical History:  She has a past medical history of Glaucoma and Hypothyroidism  ,  _______________________________________________________________________  Medical Problems:  does not have any pertinent problems on file ,  _______________________________________________________________________  Past Surgical History:   has a past surgical history that includes Gastric bypass; Hernia repair; and Bariatric Surgery (12/18/12)  ,  _______________________________________________________________________  Family History:  family history includes Hyperlipidemia in her maternal grandmother and mother; Hypertension in her maternal grandmother and mother; No Known Problems in her brother, daughter, father, paternal grandmother, and sister; Prostate cancer (age of onset: 48) in her maternal grandfather and paternal grandfather; Stomach cancer in her maternal grandmother ,  _______________________________________________________________________  Social History:   reports that she has never smoked  She has never used smokeless tobacco  She reports current alcohol use of about 2 0 standard drinks of alcohol per week  She reports that she does not use drugs  ,  _______________________________________________________________________  Allergies:  has No Known Allergies     _______________________________________________________________________  Current Outpatient Medications   Medication Sig Dispense Refill    Ascorbic Acid (VITAMIN C) 1000 MG tablet Take 1,000 mg by mouth daily      bimatoprost (LUMIGAN) 0 03 % ophthalmic drops INSTILL ONE DROP IN BOTH EYES AT BEDTIME      cholecalciferol (VITAMIN D3) 1,000 units tablet Take by mouth      Iron Combinations (IRON COMPLEX) CAPS Take by mouth      levothyroxine 50 mcg tablet TAKE ONE TABLET DAILY IN THE EARLY MORNING 30 tablet 0    Multiple Vitamins-Minerals (MULTIVITAMIN ADULT PO) Take by mouth      Norethin-Eth Estradiol-Fe (Kaitlib Fe) 0 8-25 MG-MCG CHEW Chew 25 mcg daily 84 tablet 3    RESTASIS 0 05 % ophthalmic emulsion INSTILL ONE DROP INTO BOTH EYES EVERY 12 HOURS  6    VITAMIN A PO Take 3,000 Units by mouth       No current facility-administered medications for this visit      _______________________________________________________________________  Review of Systems   Constitutional: Positive for fatigue  Negative for chills and fever  HENT: Positive for congestion and sinus pressure  Negative for ear pain, postnasal drip, rhinorrhea, sinus pain, sore throat and trouble swallowing  Respiratory: Positive for cough  Negative for chest tightness, shortness of breath and wheezing  Cardiovascular: Negative for chest pain  Gastrointestinal: Negative for abdominal pain, diarrhea, nausea and vomiting  Genitourinary: Negative for difficulty urinating  Musculoskeletal: Negative for arthralgias  Skin: Negative for rash  Neurological: Positive for headaches  Negative for dizziness  Objective:  Vitals:    05/13/22 1014   BP: 120/80   BP Location: Left arm   Patient Position: Sitting   Cuff Size: Standard   Pulse: 78   Resp: 16   Temp: (!) 97 °F (36 1 °C)   TempSrc: Temporal   SpO2: 99%   Weight: 120 kg (264 lb)   Height: 5' 9 5" (1 765 m)     Body mass index is 38 43 kg/m²  Physical Exam  Vitals and nursing note reviewed  Constitutional:       General: She is not in acute distress  Appearance: Normal appearance  She is well-developed  She is obese  She is not ill-appearing or toxic-appearing  HENT:      Head: Normocephalic and atraumatic  Right Ear: Tympanic membrane and ear canal normal       Left Ear: Tympanic membrane and ear canal normal       Nose: Congestion present  No rhinorrhea  Mouth/Throat:      Mouth: Mucous membranes are moist       Pharynx: Oropharynx is clear  No oropharyngeal exudate or posterior oropharyngeal erythema  Cardiovascular:      Rate and Rhythm: Normal rate and regular rhythm  Heart sounds: Normal heart sounds  No murmur heard  Pulmonary:      Effort: Pulmonary effort is normal  No respiratory distress  Breath sounds: Normal breath sounds  No wheezing     Musculoskeletal:      Cervical back: Normal range of motion and neck supple  Lymphadenopathy:      Cervical: No cervical adenopathy  Neurological:      Mental Status: She is alert

## 2022-05-13 NOTE — ASSESSMENT & PLAN NOTE
Pt tested positive for COVID today  Her symptoms started on 5/11/22  Pt instructed to quarantine for 7 days  Discussed MAB infusion vs Paxlovid vs symptomatic treatment  Pt wishes to try symptomatic treatment but she knows to call if symptoms worsen and can try one of the other treatment options  Pt told to increase rest and fluids  Can take tylenol or advil for fever or pain  Can take sudafed for congestion and robitussin DM for cough  Pt advised to take Vit C, Vit D, and Zinc  Call if symptoms worsen or go to ER

## 2022-06-03 ENCOUNTER — RA CDI HCC (OUTPATIENT)
Dept: OTHER | Facility: HOSPITAL | Age: 47
End: 2022-06-03

## 2022-06-03 NOTE — PROGRESS NOTES
[facility] 75  coding opportunities       Chart reviewed, no opportunity found: CHART REVIEWED, NO OPPORTUNITY FOUND        Patients Insurance        Commercial Insurance: [employer]

## 2022-06-10 ENCOUNTER — OFFICE VISIT (OUTPATIENT)
Dept: FAMILY MEDICINE CLINIC | Facility: CLINIC | Age: 47
End: 2022-06-10
Payer: COMMERCIAL

## 2022-06-10 VITALS
HEIGHT: 70 IN | DIASTOLIC BLOOD PRESSURE: 74 MMHG | OXYGEN SATURATION: 99 % | BODY MASS INDEX: 37.94 KG/M2 | SYSTOLIC BLOOD PRESSURE: 106 MMHG | HEART RATE: 50 BPM | TEMPERATURE: 97.2 F | WEIGHT: 265 LBS | RESPIRATION RATE: 16 BRPM

## 2022-06-10 DIAGNOSIS — Z12.11 SCREENING FOR COLON CANCER: ICD-10-CM

## 2022-06-10 DIAGNOSIS — E66.09 CLASS 2 OBESITY DUE TO EXCESS CALORIES WITHOUT SERIOUS COMORBIDITY WITH BODY MASS INDEX (BMI) OF 37.0 TO 37.9 IN ADULT: ICD-10-CM

## 2022-06-10 DIAGNOSIS — E03.9 ACQUIRED HYPOTHYROIDISM: Primary | ICD-10-CM

## 2022-06-10 PROCEDURE — 3725F SCREEN DEPRESSION PERFORMED: CPT | Performed by: FAMILY MEDICINE

## 2022-06-10 PROCEDURE — 99213 OFFICE O/P EST LOW 20 MIN: CPT | Performed by: FAMILY MEDICINE

## 2022-06-10 PROCEDURE — 1036F TOBACCO NON-USER: CPT | Performed by: FAMILY MEDICINE

## 2022-06-10 PROCEDURE — 3008F BODY MASS INDEX DOCD: CPT | Performed by: FAMILY MEDICINE

## 2022-06-10 NOTE — PROGRESS NOTES
Depression Screening and Follow-up Plan: Patient was screened for depression during today's encounter  They screened negative with a PHQ-2 score of 0  Assessment/Plan:         Problem List Items Addressed This Visit        Endocrine    Hypothyroidism - Primary     TSH was 4 25 and Free T4 was 0 95 in Dec 2021  Continue levothyroxine 50 mcg qd  Recheck levels  Relevant Orders    T4, free    TSH, 3rd generation       Other    Class 2 obesity due to excess calories without serious comorbidity with body mass index (BMI) of 37 0 to 37 9 in adult     Discussed smaller portions, healthy snacks, and regular exercise  Other Visit Diagnoses     Screening for colon cancer        Relevant Orders    Cologuard            Subjective:      Patient ID: Jessica Anderson is a 50 y o  female  Pt here for f/u Hypothyroidism and obesity  Pt doing ok  No cp/sob  Pt has been exercising on regular basis  Energy level has been ok  Has had some swelling of hands and feels tired at times  Has gained 3 lbs since last visit  The following portions of the patient's history were reviewed and updated as appropriate:   Past Medical History:  She has a past medical history of Glaucoma and Hypothyroidism  ,  _______________________________________________________________________  Medical Problems:  does not have any pertinent problems on file ,  _______________________________________________________________________  Past Surgical History:   has a past surgical history that includes Gastric bypass; Hernia repair; and Bariatric Surgery (12/18/12)  ,  _______________________________________________________________________  Family History:  family history includes Hyperlipidemia in her maternal grandmother and mother; Hypertension in her maternal grandmother and mother; No Known Problems in her brother, daughter, father, paternal grandmother, and sister; Prostate cancer (age of onset: 48) in her maternal grandfather and paternal grandfather; Stomach cancer in her maternal grandmother ,  _______________________________________________________________________  Social History:   reports that she has never smoked  She has never used smokeless tobacco  She reports current alcohol use of about 2 0 standard drinks of alcohol per week  She reports that she does not use drugs  ,  _______________________________________________________________________  Allergies:  has No Known Allergies     _______________________________________________________________________  Current Outpatient Medications   Medication Sig Dispense Refill    Ascorbic Acid (VITAMIN C) 1000 MG tablet Take 1,000 mg by mouth daily      bimatoprost (LUMIGAN) 0 03 % ophthalmic drops INSTILL ONE DROP IN BOTH EYES AT BEDTIME      cholecalciferol (VITAMIN D3) 1,000 units tablet Take by mouth      Iron Combinations (IRON COMPLEX) CAPS Take by mouth      levothyroxine 50 mcg tablet TAKE ONE TABLET DAILY IN THE EARLY MORNING 30 tablet 0    Multiple Vitamins-Minerals (MULTIVITAMIN ADULT PO) Take by mouth      Norethin-Eth Estradiol-Fe (Kaitlib Fe) 0 8-25 MG-MCG CHEW Chew 25 mcg daily 84 tablet 3    RESTASIS 0 05 % ophthalmic emulsion INSTILL ONE DROP INTO BOTH EYES EVERY 12 HOURS  6    VITAMIN A PO Take 3,000 Units by mouth       No current facility-administered medications for this visit      _______________________________________________________________________  Review of Systems   Constitutional: Negative for fatigue and unexpected weight change  Respiratory: Negative for cough, chest tightness and shortness of breath  Cardiovascular: Negative for chest pain and palpitations  Gastrointestinal: Negative for abdominal pain, constipation, diarrhea, nausea and vomiting  Genitourinary: Negative for difficulty urinating  Musculoskeletal: Negative for arthralgias  Skin: Negative for rash  Neurological: Negative for dizziness and headaches  Objective:  Vitals:    06/10/22 0748   BP: 106/74   BP Location: Left arm   Patient Position: Sitting   Cuff Size: Large   Pulse: (!) 50   Resp: 16   Temp: (!) 97 2 °F (36 2 °C)   TempSrc: Temporal   SpO2: 99%   Weight: 120 kg (265 lb)   Height: 5' 9 5" (1 765 m)     Body mass index is 38 57 kg/m²  Physical Exam  Vitals and nursing note reviewed  Constitutional:       Appearance: Normal appearance  She is well-developed  She is obese  HENT:      Head: Normocephalic and atraumatic  Cardiovascular:      Rate and Rhythm: Normal rate and regular rhythm  Heart sounds: Normal heart sounds  No murmur heard  Pulmonary:      Effort: Pulmonary effort is normal  No respiratory distress  Breath sounds: Normal breath sounds  No wheezing  Musculoskeletal:      Cervical back: Normal range of motion and neck supple  Right lower leg: No edema  Left lower leg: No edema  Lymphadenopathy:      Cervical: No cervical adenopathy  Neurological:      Mental Status: She is alert and oriented to person, place, and time  Psychiatric:         Mood and Affect: Mood normal          Behavior: Behavior normal          Thought Content:  Thought content normal          Judgment: Judgment normal

## 2022-06-11 ENCOUNTER — APPOINTMENT (OUTPATIENT)
Dept: LAB | Facility: CLINIC | Age: 47
End: 2022-06-11
Payer: COMMERCIAL

## 2022-06-11 DIAGNOSIS — E03.9 ACQUIRED HYPOTHYROIDISM: ICD-10-CM

## 2022-06-11 LAB
T4 FREE SERPL-MCNC: 0.94 NG/DL (ref 0.76–1.46)
TSH SERPL DL<=0.05 MIU/L-ACNC: 1.84 UIU/ML (ref 0.45–4.5)

## 2022-06-11 PROCEDURE — 36415 COLL VENOUS BLD VENIPUNCTURE: CPT

## 2022-06-11 PROCEDURE — 84439 ASSAY OF FREE THYROXINE: CPT

## 2022-06-11 PROCEDURE — 84443 ASSAY THYROID STIM HORMONE: CPT

## 2022-06-26 LAB — COLOGUARD RESULT REPORTABLE: NEGATIVE

## 2022-10-12 PROBLEM — Z01.419 ROUTINE GYNECOLOGICAL EXAMINATION: Status: RESOLVED | Noted: 2021-02-17 | Resolved: 2022-10-12

## 2022-12-05 ENCOUNTER — RA CDI HCC (OUTPATIENT)
Dept: OTHER | Facility: HOSPITAL | Age: 47
End: 2022-12-05

## 2022-12-12 ENCOUNTER — OFFICE VISIT (OUTPATIENT)
Dept: FAMILY MEDICINE CLINIC | Facility: CLINIC | Age: 47
End: 2022-12-12

## 2022-12-12 VITALS
HEART RATE: 72 BPM | TEMPERATURE: 98.8 F | RESPIRATION RATE: 16 BRPM | BODY MASS INDEX: 40.29 KG/M2 | DIASTOLIC BLOOD PRESSURE: 84 MMHG | HEIGHT: 69 IN | WEIGHT: 272 LBS | SYSTOLIC BLOOD PRESSURE: 126 MMHG | OXYGEN SATURATION: 99 %

## 2022-12-12 DIAGNOSIS — E03.9 ACQUIRED HYPOTHYROIDISM: Primary | ICD-10-CM

## 2022-12-12 DIAGNOSIS — H40.9 GLAUCOMA OF BOTH EYES, UNSPECIFIED GLAUCOMA TYPE: ICD-10-CM

## 2022-12-12 DIAGNOSIS — M72.2 PLANTAR FASCIITIS: ICD-10-CM

## 2022-12-12 DIAGNOSIS — E66.09 CLASS 2 OBESITY DUE TO EXCESS CALORIES WITHOUT SERIOUS COMORBIDITY WITH BODY MASS INDEX (BMI) OF 37.0 TO 37.9 IN ADULT: ICD-10-CM

## 2022-12-12 RX ORDER — BIMATOPROST 0.01 %
DROPS OPHTHALMIC (EYE)
COMMUNITY
Start: 2022-12-07

## 2022-12-12 NOTE — PROGRESS NOTES
Assessment/Plan:         Problem List Items Addressed This Visit        Endocrine    Hypothyroidism - Primary     Recheck TSH and Free T4  Continue same dose of levothyroxine 50 mcg qd  Relevant Orders    TSH, 3rd generation    T4, free       Musculoskeletal and Integument    Plantar fasciitis     Pt has had it off and on for mths  Discussed heel pads and stretching exercises given  If worsens, will refer to Podiatry  Other    Glaucoma     Stable  Continue Lumigan 0 03% 1 drop to each eye at HS per Ophtho  Relevant Medications    Lumigan 0 01 % ophthalmic drops    Class 2 obesity due to excess calories without serious comorbidity with body mass index (BMI) of 37 0 to 37 9 in adult     Pt gained 7 lbs since last visit  Discussed smaller portions, healthy snacks, and regular exercise  Subjective:      Patient ID: Bertha Montes is a 50 y o  female  Pt here for f/u Hypothyroidism, Glaucoma, Pbesity  Doing well  No cp/sob  No headaches  No abd pain  Energy level ok  Pt exercises at times  Gets pain in heels when on feet a lot  Worse when gets up in the AM or after sitting  Not every day  The following portions of the patient's history were reviewed and updated as appropriate:   Past Medical History:  She has a past medical history of Glaucoma and Hypothyroidism  ,  _______________________________________________________________________  Medical Problems:  does not have any pertinent problems on file ,  _______________________________________________________________________  Past Surgical History:   has a past surgical history that includes Gastric bypass; Hernia repair; and Bariatric Surgery (12/18/12)  ,  _______________________________________________________________________  Family History:  family history includes Hyperlipidemia in her maternal grandmother and mother; Hypertension in her maternal grandmother and mother; No Known Problems in her brother, daughter, father, paternal grandmother, and sister; Prostate cancer (age of onset: 48) in her maternal grandfather and paternal grandfather; Stomach cancer in her maternal grandmother ,  _______________________________________________________________________  Social History:   reports that she has never smoked  She has never used smokeless tobacco  She reports current alcohol use of about 2 0 standard drinks per week  She reports that she does not use drugs  ,  _______________________________________________________________________  Allergies:  has No Known Allergies     _______________________________________________________________________  Current Outpatient Medications   Medication Sig Dispense Refill   • Ascorbic Acid (VITAMIN C) 1000 MG tablet Take 1,000 mg by mouth daily     • cholecalciferol (VITAMIN D3) 1,000 units tablet Take by mouth     • Iron Combinations (IRON COMPLEX) CAPS Take by mouth     • levothyroxine 50 mcg tablet TAKE ONE TABLET DAILY IN THE EARLY MORNING 30 tablet 0   • Lumigan 0 01 % ophthalmic drops INSTILL ONE DROP IN [facility] EYE AT BEDTIME  • Multiple Vitamins-Minerals (MULTIVITAMIN ADULT PO) Take by mouth     • Norethin-Eth Estradiol-Fe (Kaitlib Fe) 0 8-25 MG-MCG CHEW Chew 25 mcg daily 84 tablet 3   • RESTASIS 0 05 % ophthalmic emulsion INSTILL ONE DROP INTO BOTH EYES EVERY 12 HOURS  6   • VITAMIN A PO Take 3,000 Units by mouth     • bimatoprost (LUMIGAN) 0 03 % ophthalmic drops INSTILL ONE DROP IN BOTH EYES AT BEDTIME (Patient not taking: Reported on 12/12/2022)       No current facility-administered medications for this visit      _______________________________________________________________________  Review of Systems   Constitutional: Negative for fatigue and unexpected weight change  Respiratory: Negative for cough, chest tightness and shortness of breath  Cardiovascular: Negative for chest pain and palpitations     Gastrointestinal: Negative for abdominal pain, constipation, diarrhea, nausea and vomiting  Genitourinary: Negative for difficulty urinating  Musculoskeletal: Negative for arthralgias  B/L foot pain   Skin: Negative for rash  Neurological: Negative for dizziness and headaches  Objective:  Vitals:    12/12/22 0750   BP: 126/84   BP Location: Left arm   Patient Position: Sitting   Cuff Size: Large   Pulse: 72   Resp: 16   Temp: 98 8 °F (37 1 °C)   TempSrc: Temporal   SpO2: 99%   Weight: 123 kg (272 lb)   Height: 5' 9" (1 753 m)     Body mass index is 40 17 kg/m²  Physical Exam  Vitals and nursing note reviewed  Constitutional:       Appearance: Normal appearance  She is well-developed  She is obese  HENT:      Head: Normocephalic and atraumatic  Cardiovascular:      Rate and Rhythm: Normal rate and regular rhythm  Heart sounds: Normal heart sounds  No murmur heard  Pulmonary:      Effort: Pulmonary effort is normal  No respiratory distress  Breath sounds: Normal breath sounds  No wheezing  Musculoskeletal:      Cervical back: Normal range of motion and neck supple  Right lower leg: No edema  Left lower leg: No edema  Lymphadenopathy:      Cervical: No cervical adenopathy  Neurological:      Mental Status: She is alert and oriented to person, place, and time  Psychiatric:         Mood and Affect: Mood normal          Behavior: Behavior normal          Thought Content:  Thought content normal          Judgment: Judgment normal

## 2022-12-12 NOTE — ASSESSMENT & PLAN NOTE
Pt has had it off and on for mths  Discussed heel pads and stretching exercises given  If worsens, will refer to Podiatry

## 2022-12-12 NOTE — PATIENT INSTRUCTIONS
Plantar Fasciitis Exercises   WHAT YOU NEED TO KNOW:   Plantar fasciitis exercises help stretch your plantar fascia, calf muscles, and Achilles tendon  They also help strengthen the muscles that support your heel and foot  Exercises and stretching can help prevent plantar fasciitis from getting worse or coming back  DISCHARGE INSTRUCTIONS:   Contact your healthcare provider if:   Your pain and swelling increase  You develop new knee, hip, or back pain  You have questions or concerns about your condition or care  How to do plantar fasciitis exercises:  Ask your healthcare provider when to start these exercises and how often to do them  Slant board stretch:  Stand on a slanted board with your toes higher than your heel  Press your heel into the board  Keep your knee slightly bent  Hold this position for 1 minute  Repeat 5 times  Heel stretch:  Stand up straight with your hands on a wall  Place your injured leg slightly behind your other leg  Keep your heels flat on the floor, lean forward, and bend both knees  Hold for 30 seconds  Calf stretch:  Stand up straight with your hands on a wall  Step forward so that your uninjured foot is in front of your injured foot  Keep your front leg bent and your back leg straight  Gently lean forward until you feel your calf stretch  Hold for 30 seconds and then relax  Seated plantar fascia stretch:  Sit on a firm surface, such as the floor or a mat  Extend your legs out in front of you  Raise your injured foot a few inches off the ground  Keep your leg straight  Grab the toes of your injured foot and pull them toward you  With your other hand, feel your plantar fascia  You should feel it press outward  Hold for 30 seconds  If you cannot reach your toes, loop a towel or tie around your foot  Gently pull on the towel or tie and flex your toes toward you  Heel raises:  Stand on the injured leg  Raise your other leg off the ground   Hold onto a railing or wall for balance  Slowly rise up on the toes of your injured leg  Hold for 5 seconds  Slowly lower your heel to the ground  Toe curls:  Place a towel on the floor  Put your foot flat on the towel  Grab the towel with your toes by curling them around the towel  Lift the towel up with your toes  Toe taps:  Sit down and place your foot flat on the floor  Keep your heel on the floor  Point all your toes up toward the ceiling  While the 4 smaller toes are pointed up, bend your big toe down and tap it on the ground  Do 10 to 50 taps  Point all 5 toes up toward the ceiling again  This time keep your big toe pointed up and tap the 4 smaller toes on the ground  Do 10 to 50 taps each time  Follow up with your doctor as directed:  Write down your questions so you remember to ask them during your visits  © Copyright [employer] 2022 Information is for End User's use only and may not be sold, redistributed or otherwise used for commercial purposes  All illustrations and images included in CareNotes® are the copyrighted property of A D A M [employer] Inc  or Romaine Adams   The above information is an  only  It is not intended as medical advice for individual conditions or treatments  Talk to your doctor, nurse or pharmacist before following any medical regimen to see if it is safe and effective for you

## 2022-12-13 ENCOUNTER — APPOINTMENT (OUTPATIENT)
Dept: LAB | Facility: CLINIC | Age: 47
End: 2022-12-13

## 2022-12-13 DIAGNOSIS — E03.9 ACQUIRED HYPOTHYROIDISM: ICD-10-CM

## 2022-12-13 LAB
T4 FREE SERPL-MCNC: 0.95 NG/DL (ref 0.76–1.46)
TSH SERPL DL<=0.05 MIU/L-ACNC: 4.42 UIU/ML (ref 0.45–4.5)

## 2022-12-27 DIAGNOSIS — E03.9 ACQUIRED HYPOTHYROIDISM: ICD-10-CM

## 2022-12-28 RX ORDER — LEVOTHYROXINE SODIUM 0.05 MG/1
TABLET ORAL
Qty: 90 TABLET | Refills: 2 | Status: SHIPPED | OUTPATIENT
Start: 2022-12-28

## 2022-12-30 ENCOUNTER — HOSPITAL ENCOUNTER (OUTPATIENT)
Dept: MAMMOGRAPHY | Facility: HOSPITAL | Age: 47
Discharge: HOME | End: 2022-12-30

## 2022-12-30 VITALS — BODY MASS INDEX: 40.16 KG/M2 | WEIGHT: 271.17 LBS | HEIGHT: 69 IN

## 2022-12-30 DIAGNOSIS — Z12.31 ENCOUNTER FOR SCREENING MAMMOGRAM FOR MALIGNANT NEOPLASM OF BREAST: ICD-10-CM

## 2023-02-03 ENCOUNTER — OFFICE VISIT (OUTPATIENT)
Dept: PODIATRY | Facility: CLINIC | Age: 48
End: 2023-02-03

## 2023-02-03 ENCOUNTER — HOSPITAL ENCOUNTER (OUTPATIENT)
Dept: RADIOLOGY | Facility: HOSPITAL | Age: 48
End: 2023-02-03
Attending: PODIATRIST

## 2023-02-03 VITALS
WEIGHT: 265.4 LBS | HEART RATE: 70 BPM | DIASTOLIC BLOOD PRESSURE: 104 MMHG | HEIGHT: 69 IN | BODY MASS INDEX: 39.31 KG/M2 | OXYGEN SATURATION: 100 % | SYSTOLIC BLOOD PRESSURE: 134 MMHG

## 2023-02-03 DIAGNOSIS — M72.2 PLANTAR FASCIITIS: ICD-10-CM

## 2023-02-03 DIAGNOSIS — M79.672 PAIN OF LEFT HEEL: Primary | ICD-10-CM

## 2023-02-03 DIAGNOSIS — R52 PAIN: ICD-10-CM

## 2023-02-03 DIAGNOSIS — R52 PAIN: Primary | ICD-10-CM

## 2023-02-03 DIAGNOSIS — M79.672 PAIN OF LEFT HEEL: ICD-10-CM

## 2023-02-03 RX ORDER — TRIAMCINOLONE ACETONIDE 40 MG/ML
40 INJECTION, SUSPENSION INTRA-ARTICULAR; INTRAMUSCULAR ONCE
Status: SHIPPED | OUTPATIENT
Start: 2023-02-03

## 2023-02-03 NOTE — PATIENT INSTRUCTIONS
Arch supports:    - Powerstep [place] insoles  - Spenco Orthotic Arches  - Superfeet insoles

## 2023-02-03 NOTE — PROGRESS NOTES
Assessment/Plan:    The patient's bilateral foot x-rays taken today were personally reviewed and interpreted  Plantar calcaneal spurs are noted bilaterally  Retrocalcaneal spurs noted bilaterally  There are no fractures or dislocations bilaterally  Will follow-up on official read  The patient's clinical examination today is consistent with Plantar fasciitis of the bilateral heels, left much worse than the right  The etiology and treatment options were discussed with the patient in detail  She would like to proceed with injection therapy due to the chronicity and intensity of her left heel pain  After sterile prep with alcohol a steroid injection was administered to the point of maximum tenderness to the plantar left heel via a plantar approach  Patient was well-tolerated  Continue structure sizes at home  I did make some recommendations for some quality over-the-counter arch supports to try in her shoes and lieu of her current pair  Recommend follow-up in 3 to 4 weeks to ascertain efficacy of injection therapy  Diagnoses and all orders for this visit:    Pain  -     X-ray foot right 3+ views; Future    Pain of left heel  -     Ambulatory Referral to Podiatry  -     X-ray foot left 3+ views; Future    Plantar fasciitis          Subjective:      Patient ID: Christie Avendano is a 50 y o  female  The patient presents today for her initial consultation with [facility] with a chief complaint of left plantar heel pain that is present now for several months  Initially the pain was only with her first few steps in the morning and would eventually go away with activity  Point the pain is significant in the morning when getting out of bed and is persistent throughout the course of the day  He cannot recall any injury or trauma to her left foot  Is been no changes in shoe gear nor activity level since her symptoms started    Pain is alleviated by rest   OTC NSAID therapy has done little to alleviate her discomfort  The following portions of the patient's history were reviewed and updated as appropriate: allergies, current medications, past family history, past medical history, past social history, past surgical history and problem list     Review of Systems   Constitutional: Negative  HENT: Negative  Eyes: Negative  Respiratory: Negative  Cardiovascular: Negative  Endocrine: Negative  Musculoskeletal: Negative  Neurological: Negative  Hematological: Negative  Psychiatric/Behavioral: Negative  Objective:      BP (!) 134/104 (BP Location: Left arm, Patient Position: Sitting, Cuff Size: Standard)   Pulse 70   Ht 5' 9" (1 753 m)   Wt 120 kg (265 lb 6 4 oz)   SpO2 100%   BMI 39 19 kg/m²          Physical Exam  Constitutional:       Appearance: Normal appearance  HENT:      Head: Normocephalic and atraumatic  Nose: Nose normal    Cardiovascular:      Pulses:           Dorsalis pedis pulses are 2+ on the left side  Posterior tibial pulses are 2+ on the left side  Pulmonary:      Effort: Pulmonary effort is normal    Musculoskeletal:        Feet:    Feet:      Left foot:      Skin integrity: Skin integrity normal       Comments: (+) TTP of the plantar medial tubercle left heel; no pain with lateral squeeze of calcaneus, no ecchymosis, no edema, no calor, no erythema foot  Skin:     General: Skin is warm  Capillary Refill: Capillary refill takes less than 2 seconds  Neurological:      General: No focal deficit present  Mental Status: She is alert and oriented to person, place, and time  Psychiatric:         Mood and Affect: Mood normal          Behavior: Behavior normal          Thought Content: Thought content normal                  Foot injection     Date/Time 2/3/2023 3:35 PM     Performed by  Mario Alberto Arevalo DPM     Authorized by Mario Alberto Arevalo DPM      Universal Protocol   Consent: Verbal consent obtained    Risks and benefits: risks, benefits and alternatives were discussed  Consent given by: patient  Time out: Immediately prior to procedure a "time out" was called to verify the correct patient, procedure, equipment, support staff and site/side marked as required  Timeout called at: 2/3/2023 3:35 PM   Patient understanding: patient states understanding of the procedure being performed  Patient consent: the patient's understanding of the procedure matches consent given  Patient identity confirmed: verbally with patient and provided demographic data        Local anesthesia used: yes      Anesthesia: local infiltration     Anesthesia   Local anesthesia used: yes  Local Anesthetic: bupivacaine 0 25% without epinephrine  Anesthetic total: 2 mL     Sedation   Patient sedated: no        Specimen: no    Culture: no   Procedure Details   Procedure Notes: After prepping the skin with alcohol, the point of maximum tenderness was palpated to the affected left heel and injection was administered consisting of 2 mL of 0 25% bupivacaine plain, and 0 5 mL of Kenalog 40 via a plantar approach  A dry sterile dressing was applied  The procedure was tolerated well

## 2023-02-27 ENCOUNTER — OFFICE VISIT (OUTPATIENT)
Dept: PODIATRY | Facility: CLINIC | Age: 48
End: 2023-02-27

## 2023-02-27 VITALS
DIASTOLIC BLOOD PRESSURE: 98 MMHG | SYSTOLIC BLOOD PRESSURE: 131 MMHG | BODY MASS INDEX: 39.78 KG/M2 | HEIGHT: 69 IN | WEIGHT: 268.6 LBS | HEART RATE: 73 BPM | OXYGEN SATURATION: 100 %

## 2023-02-27 DIAGNOSIS — M72.2 PLANTAR FASCIITIS: Primary | ICD-10-CM

## 2023-02-27 NOTE — PROGRESS NOTES
Assessment/Plan:     The patient's clinical examination today is consistent with a improving plantar fasciitis of the left foot  Most of her tenderness can be isolated along the medial band of the plantar fascia at the level of the mid arch  There is no erythema nor ecchymosis  There is no palpable defect of the medial band  We will focus on conservative therapies by means of some home stretching exercises which were included in her after visit summary  We also discussed the importance of good supportive shoes with well-formed arches and the benefits of OTC arch supports which she has already purchased  Recommend follow-up in 4 to 6 weeks  Diagnoses and all orders for this visit:    Plantar fasciitis          Subjective:     Patient ID: Christie Avendano is a 50 y o  female  The patient presents today for follow-up of left foot Plantar fasciitis  She does note some good interval improvement since her last visit with injection therapy  She states that the first few steps are not as painful  She does note that she can still get pain levels up to a 5 out of 10 on the pain scale  PAST MEDICAL HISTORY:  Past Medical History:   Diagnosis Date   • Glaucoma    • Hypothyroidism        PAST SURGICAL HISTORY:  Past Surgical History:   Procedure Laterality Date   • BARIATRIC SURGERY  12/18/12   • GASTRIC BYPASS      surgery for morbid obesity 12/2012   • HERNIA REPAIR      incisional        ALLERGIES:  Patient has no known allergies  MEDICATIONS:  Current Outpatient Medications   Medication Sig Dispense Refill   • Ascorbic Acid (VITAMIN C) 1000 MG tablet Take 1,000 mg by mouth daily     • cholecalciferol (VITAMIN D3) 1,000 units tablet Take by mouth     • Iron Combinations (IRON COMPLEX) CAPS Take by mouth     • levothyroxine 50 mcg tablet TAKE 1 TABLET BY MOUTH DAILY IN THE EARLY MORNING 90 tablet 2   • Lumigan 0 01 % ophthalmic drops INSTILL ONE DROP IN [facility] EYE AT BEDTIME       • Multiple Vitamins-Minerals (MULTIVITAMIN ADULT PO) Take by mouth     • RESTASIS 0 05 % ophthalmic emulsion INSTILL ONE DROP INTO BOTH EYES EVERY 12 HOURS  6   • VITAMIN A PO Take 3,000 Units by mouth     • bimatoprost (LUMIGAN) 0 03 % ophthalmic drops INSTILL ONE DROP IN BOTH EYES AT BEDTIME (Patient not taking: Reported on 12/12/2022)     • Norethin-Eth Estradiol-Fe (Kaitlib Fe) 0 8-25 MG-MCG CHEW Chew 25 mcg daily 84 tablet 3     Current Facility-Administered Medications   Medication Dose Route Frequency Provider Last Rate Last Admin   • triamcinolone acetonide (KENALOG-40) 40 mg/mL injection 40 mg  40 mg Intra-lesional Once Yolanda Hernandez DPM           SOCIAL HISTORY:  Social History     Socioeconomic History   • Marital status: Single     Spouse name: None   • Number of children: None   • Years of education: None   • Highest education level: None   Occupational History   • Occupation:    Tobacco Use   • Smoking status: Never   • Smokeless tobacco: Never   Vaping Use   • Vaping Use: Never used   Substance and Sexual Activity   • Alcohol use: Yes     Alcohol/week: 2 0 standard drinks     Types: 2 Standard drinks or equivalent per week   • Drug use: No   • Sexual activity: Yes     Partners: Male     Birth control/protection: Other     Comment: Pill   Other Topics Concern   • None   Social History Narrative    Non  -White    Exercises regularly     as per [facility]    Primary language - English     Social Determinants of Health     Financial Resource Strain: Not on file   Food Insecurity: Not on file   Transportation Needs: Not on file   Physical Activity: Not on file   Stress: Not on file   Social Connections: Not on file   Intimate Partner Violence: Not on file   Housing Stability: Not on file        Review of Systems   Constitutional: Negative  HENT: Negative  Eyes: Negative  Respiratory: Negative  Cardiovascular: Negative  Endocrine: Negative  Musculoskeletal: Negative  Skin: Negative  Neurological: Negative  Hematological: Negative  Psychiatric/Behavioral: Negative  Objective:     Physical Exam  Constitutional:       Appearance: Normal appearance  HENT:      Head: Normocephalic and atraumatic  Nose: Nose normal    Cardiovascular:      Pulses:           Dorsalis pedis pulses are 2+ on the left side  Posterior tibial pulses are 2+ on the left side  Pulmonary:      Effort: Pulmonary effort is normal    Feet:      Comments: There is some persistent, mild tenderness to palpation along the medial band of the left plantar fascia without any erythema nor ecchymosis; plantar fascia is palpable and intact; there is no significant tenderness palpation to the plantar medial tubercle of the left os calcis today; there is no tenderness with lateral squeeze noted to the retrocalcaneal bursa or the Achilles tendon  Skin:     General: Skin is warm  Capillary Refill: Capillary refill takes less than 2 seconds  Neurological:      General: No focal deficit present  Mental Status: She is alert and oriented to person, place, and time  Psychiatric:         Mood and Affect: Mood normal          Behavior: Behavior normal          Thought Content:  Thought content normal

## 2023-03-06 NOTE — PROGRESS NOTES
Assessment/Plan   Problem List Items Addressed This Visit    None  Visit Diagnoses     Well woman exam    -  Primary    Encounter for screening mammogram for malignant neoplasm of breast        Relevant Orders    Mammo screening bilateral w 3d & cad          Discussion    All questions have been answered to her satisfaction  RTO for APE or sooner if needed  Pap deferred  Mammo ordered  Subjective     HPI   Trupti Franco is a 46 y o  female who presents for annual well woman exam    LMP -2/3/23 ; Pt stopped taking the pills in December  Periods have been a bit more spaced out since then  Feb period was short, painful but short  Feeling well off of pills, will watch menses for now  No vulvar itch/burn; No vaginal itch/burn; No abn discharge or odor; No urinary sx - burning/pain/frequency/hematuria    (+) SBEs - no breast masses, asymmetry, nipple discharge or bleeding, changes in skin of breast, or breast tenderness bilaterally    No abd/pelvic pain or HAs; No menopausal symptoms: No hot flashes/night sweats, problems with intercourse, vaginal dryness; sleeping well  Pt is sexually active in a mutually monog/ sexual relationship; No issues with intercourse; She declines sti/hiv/hep testing; Feels safe at home    Contraception: vasectomy     (+) PCP for routine Bw/care; Last Pap - 21 WNL neg HPV   History of abnormal Pap smear:  Denies   Last mammo - 22 BIRADs 1   History of abnormal mammogram: denies   Last colonoscopy - 2022 cologuard      Review of Systems   Constitutional: Negative  Respiratory: Negative  Gastrointestinal: Negative  Endocrine: Negative  Genitourinary: Negative          The following portions of the patient's history were reviewed and updated as appropriate: allergies, current medications, past family history, past medical history, past social history, past surgical history and problem list          OB History        1    Para   1    Term 1            AB        Living   1       SAB        IAB        Ectopic        Multiple        Live Births   1                 Past Medical History:   Diagnosis Date   • Glaucoma    • Hypothyroidism        Past Surgical History:   Procedure Laterality Date   • BARIATRIC SURGERY  12   • GASTRIC BYPASS      surgery for morbid obesity 2012   • HERNIA REPAIR      incisional       Family History   Problem Relation Age of Onset   • Hypertension Mother         benign essential   • Hyperlipidemia Mother    • No Known Problems Father    • No Known Problems Sister    • No Known Problems Daughter    • Hypertension Maternal Grandmother         benign essential   • Hyperlipidemia Maternal Grandmother    • Stomach cancer Maternal Grandmother [de-identified]   • Cancer Maternal Grandmother    • Osteoporosis Maternal Grandmother    • Thyroid disease Maternal Grandmother    • Prostate cancer Maternal Grandfather 48   • No Known Problems Paternal Grandmother    • Prostate cancer Paternal Grandfather 48   • No Known Problems Brother        Social History     Socioeconomic History   • Marital status: Single     Spouse name: Not on file   • Number of children: Not on file   • Years of education: Not on file   • Highest education level: Not on file   Occupational History   • Occupation:    Tobacco Use   • Smoking status: Never   • Smokeless tobacco: Never   Vaping Use   • Vaping Use: Never used   Substance and Sexual Activity   • Alcohol use:  Yes     Alcohol/week: 2 0 standard drinks     Types: 2 Standard drinks or equivalent per week   • Drug use: No   • Sexual activity: Yes     Partners: Male     Birth control/protection: Male Sterilization     Comment: Pill   Other Topics Concern   • Not on file   Social History Narrative    Non  -White    Exercises regularly     as per [employer]    Primary language - English     Social Determinants of Health     Financial Resource Strain: Not on file   Food Insecurity: Not on file   Transportation Needs: Not on file   Physical Activity: Not on file   Stress: Not on file   Social Connections: Not on file   Intimate Partner Violence: Not on file   Housing Stability: Not on file         Current Outpatient Medications:   •  Ascorbic Acid (VITAMIN C) 1000 MG tablet, Take 1,000 mg by mouth daily, Disp: , Rfl:   •  cholecalciferol (VITAMIN D3) 1,000 units tablet, Take by mouth, Disp: , Rfl:   •  Iron Combinations (IRON COMPLEX) CAPS, Take by mouth, Disp: , Rfl:   •  levothyroxine 50 mcg tablet, TAKE 1 TABLET BY MOUTH DAILY IN THE EARLY MORNING, Disp: 90 tablet, Rfl: 2  •  Lumigan 0 01 % ophthalmic drops, INSTILL ONE DROP IN EACH EYE AT BEDTIME , Disp: , Rfl:   •  Multiple Vitamins-Minerals (MULTIVITAMIN ADULT PO), Take by mouth, Disp: , Rfl:   •  RESTASIS 0 05 % ophthalmic emulsion, INSTILL ONE DROP INTO BOTH EYES EVERY 12 HOURS, Disp: , Rfl: 6  •  VITAMIN A PO, Take 3,000 Units by mouth, Disp: , Rfl:   •  Norethin-Eth Estradiol-Fe (Kaitlib Fe) 0 8-25 MG-MCG CHEW, Chew 25 mcg daily, Disp: 84 tablet, Rfl: 3    Current Facility-Administered Medications:   •  triamcinolone acetonide (KENALOG-40) 40 mg/mL injection 40 mg, 40 mg, Intra-lesional, Once, Odette Click, DPM    No Known Allergies    Objective   Vitals:    03/07/23 0830   BP: 118/80   BP Location: Left arm   Patient Position: Sitting   Cuff Size: Large   Weight: 122 kg (268 lb)   Height: 5' 9" (1 753 m)     Physical Exam  Vitals reviewed  HENT:      Head: Normocephalic and atraumatic  Cardiovascular:      Rate and Rhythm: Normal rate and regular rhythm  Pulmonary:      Effort: Pulmonary effort is normal       Breath sounds: Normal breath sounds  Chest:   Breasts:     Breasts are symmetrical       Right: No swelling, bleeding, inverted nipple, mass, nipple discharge, skin change or tenderness  Left: No swelling, bleeding, inverted nipple, mass, nipple discharge, skin change or tenderness     Abdominal:      General: Abdomen is flat  Bowel sounds are normal       Palpations: Abdomen is soft  Tenderness: There is no abdominal tenderness  There is no right CVA tenderness, left CVA tenderness or guarding  Genitourinary:     General: Normal vulva  Pubic Area: No rash  Labia:         Right: No rash, tenderness, lesion or injury  Left: No rash, tenderness, lesion or injury  Urethra: No prolapse, urethral pain, urethral swelling or urethral lesion  Vagina: Normal  No signs of injury and foreign body  No vaginal discharge or erythema  Cervix: Normal       Uterus: Normal        Adnexa: Right adnexa normal and left adnexa normal    Musculoskeletal:      Cervical back: Neck supple  Lymphadenopathy:      Upper Body:      Right upper body: No axillary adenopathy  Left upper body: No axillary adenopathy  Skin:     General: Skin is warm and dry  Neurological:      Mental Status: She is alert and oriented to person, place, and time  Psychiatric:         Mood and Affect: Mood normal          Behavior: Behavior normal          Thought Content: Thought content normal          Judgment: Judgment normal          There are no Patient Instructions on file for this visit

## 2023-03-07 ENCOUNTER — OFFICE VISIT (OUTPATIENT)
Dept: OBGYN CLINIC | Facility: CLINIC | Age: 48
End: 2023-03-07

## 2023-03-07 VITALS
HEIGHT: 69 IN | WEIGHT: 268 LBS | DIASTOLIC BLOOD PRESSURE: 80 MMHG | BODY MASS INDEX: 39.69 KG/M2 | SYSTOLIC BLOOD PRESSURE: 118 MMHG

## 2023-03-07 DIAGNOSIS — Z01.419 WELL WOMAN EXAM: Primary | ICD-10-CM

## 2023-03-07 DIAGNOSIS — Z12.31 ENCOUNTER FOR SCREENING MAMMOGRAM FOR MALIGNANT NEOPLASM OF BREAST: ICD-10-CM

## 2023-06-06 ENCOUNTER — OFFICE VISIT (OUTPATIENT)
Dept: PODIATRY | Facility: CLINIC | Age: 48
End: 2023-06-06
Payer: COMMERCIAL

## 2023-06-06 VITALS
DIASTOLIC BLOOD PRESSURE: 89 MMHG | SYSTOLIC BLOOD PRESSURE: 128 MMHG | HEART RATE: 68 BPM | WEIGHT: 270 LBS | OXYGEN SATURATION: 98 % | BODY MASS INDEX: 39.99 KG/M2 | HEIGHT: 69 IN

## 2023-06-06 DIAGNOSIS — M72.2 PLANTAR FASCIITIS OF RIGHT FOOT: Primary | ICD-10-CM

## 2023-06-06 DIAGNOSIS — M77.52 TENDINITIS OF LEFT FOOT: ICD-10-CM

## 2023-06-06 PROCEDURE — 20550 NJX 1 TENDON SHEATH/LIGAMENT: CPT | Performed by: PODIATRIST

## 2023-06-06 PROCEDURE — 99213 OFFICE O/P EST LOW 20 MIN: CPT | Performed by: PODIATRIST

## 2023-06-06 RX ORDER — TRIAMCINOLONE ACETONIDE 40 MG/ML
40 INJECTION, SUSPENSION INTRA-ARTICULAR; INTRAMUSCULAR ONCE
Status: COMPLETED | OUTPATIENT
Start: 2023-06-06 | End: 2023-06-06

## 2023-06-06 RX ADMIN — TRIAMCINOLONE ACETONIDE 40 MG: 40 INJECTION, SUSPENSION INTRA-ARTICULAR; INTRAMUSCULAR at 17:07

## 2023-06-06 RX ADMIN — TRIAMCINOLONE ACETONIDE 40 MG: 40 INJECTION, SUSPENSION INTRA-ARTICULAR; INTRAMUSCULAR at 17:06

## 2023-06-06 NOTE — PROGRESS NOTES
Assessment/Plan:     The patient's clinical examination today significant for mild tenderness palpation to the plantar aspect of the right heel at the insertional site of the plantar fascia centered mostly around the central band  There is no erythema, no edema, nor ecchymosis noted to the right heel  There is mild to moderate tenderness palpation along the area of the extensor digitorum brevis muscle on the left foot and its associated tenderness to the central 3 metatarsals  Tenderness is also exacerbated with passive and active plantarflexion of the middle 3 toes  The patient's clinical examination is consistent with a plantar fasciitis of the right heel as well as extensor tendinitis of the extensor addition to her brevis muscle  Treatment options were discussed with the patient and she is open to physical therapy consultation for stretching and strengthening of the extensor tendons as well as management of her right foot plantar fasciitis  In the interim, she will like to proceed with injection therapy to the right heel as it has worked well for her in the past for her left plantar fasciitis  For prepping of the skin with alcohol, the medial and central bands of the plantar fascia were injected via a plantar approach utilizing ethyl chloride for topical anesthesia  The CSI was well-tolerated and administered without complication  Recommend follow-up in 4 to 6 weeks to assess efficacy of injection therapy as well as progress of her bilateral foot pain with physical therapy  Diagnoses and all orders for this visit:    Tendinitis of left foot  -     Ambulatory referral to Physical Therapy; Future    Plantar fasciitis of right foot  -     Ambulatory referral to Physical Therapy; Future          Subjective:     Patient ID: Charanjit Mas is a 46 y o  female  The patient presents today with a chief complaint of bilateral foot pain that has been present now for several weeks    She cannot recall any specific injury or trauma to either foot  She was previously seen by us for left heel plantar fasciitis  At this point in time, her left heel is feeling good and she has no pain there  She is having some similar symptoms of plantar fasciitis to her right heel  On the left side, she notes tenderness along the dorsal lateral aspect of the midfoot that radiates towards the central 3 toes  PAST MEDICAL HISTORY:  Past Medical History:   Diagnosis Date   • Glaucoma    • Hypothyroidism        PAST SURGICAL HISTORY:  Past Surgical History:   Procedure Laterality Date   • BARIATRIC SURGERY  12/18/12   • GASTRIC BYPASS      surgery for morbid obesity 12/2012   • HERNIA REPAIR      incisional        ALLERGIES:  Patient has no known allergies  MEDICATIONS:  Current Outpatient Medications   Medication Sig Dispense Refill   • Ascorbic Acid (VITAMIN C) 1000 MG tablet Take 1,000 mg by mouth daily     • cholecalciferol (VITAMIN D3) 1,000 units tablet Take by mouth     • levothyroxine 50 mcg tablet TAKE 1 TABLET BY MOUTH DAILY IN THE EARLY MORNING 90 tablet 2   • Lumigan 0 01 % ophthalmic drops INSTILL ONE DROP IN [facility] EYE AT BEDTIME       • Multiple Vitamins-Minerals (MULTIVITAMIN ADULT PO) Take by mouth     • RESTASIS 0 05 % ophthalmic emulsion INSTILL ONE DROP INTO BOTH EYES EVERY 12 HOURS  6   • Iron Combinations (IRON COMPLEX) CAPS Take by mouth     • Norethin-Eth Estradiol-Fe (Kaitlib Fe) 0 8-25 MG-MCG CHEW Chew 25 mcg daily 84 tablet 3   • VITAMIN A PO Take 3,000 Units by mouth       Current Facility-Administered Medications   Medication Dose Route Frequency Provider Last Rate Last Admin   • triamcinolone acetonide (KENALOG-40) 40 mg/mL injection 40 mg  40 mg Intra-lesional Once Brunetta Chamber, DPM           SOCIAL HISTORY:  Social History     Socioeconomic History   • Marital status: Single     Spouse name: None   • Number of children: None   • Years of education: None   • Highest education level: None Occupational History   • Occupation:    Tobacco Use   • Smoking status: Never   • Smokeless tobacco: Never   Vaping Use   • Vaping Use: Never used   Substance and Sexual Activity   • Alcohol use: Yes     Alcohol/week: 2 0 standard drinks of alcohol     Types: 2 Standard drinks or equivalent per week   • Drug use: No   • Sexual activity: Yes     Partners: Male     Birth control/protection: Male Sterilization     Comment: Pill   Other Topics Concern   • None   Social History Narrative    Non  -White    Exercises regularly     as per [employer]    Primary language - English     Social Determinants of Health     Financial Resource Strain: Not on file   Food Insecurity: Not on file   Transportation Needs: Not on file   Physical Activity: Not on file   Stress: Not on file   Social Connections: Not on file   Intimate Partner Violence: Not on file   Housing Stability: Not on file        Review of Systems   Constitutional: Negative  HENT: Negative  Eyes: Negative  Respiratory: Negative  Cardiovascular: Negative  Endocrine: Negative  Musculoskeletal: Negative  Neurological: Negative  Hematological: Negative  Psychiatric/Behavioral: Negative  Objective:     Physical Exam  Constitutional:       Appearance: Normal appearance  HENT:      Head: Normocephalic and atraumatic  Nose: Nose normal    Cardiovascular:      Pulses:           Dorsalis pedis pulses are 2+ on the right side and 2+ on the left side  Posterior tibial pulses are 2+ on the right side and 2+ on the left side  Pulmonary:      Effort: Pulmonary effort is normal    Feet:      Comments: The patient's clinical examination today significant for mild tenderness palpation to the plantar aspect of the right heel at the insertional site of the plantar fascia centered mostly around the central band  There is no erythema, no edema, nor ecchymosis noted to the right heel    There is mild to "moderate tenderness palpation along the area of the extensor digitorum brevis muscle on the left foot and its associated tenderness to the central 3 metatarsals  Tenderness is also exacerbated with passive and active plantarflexion of the middle 3 toes  There is no erythema no edema nor calor or ecchymosis noted to the patient's left foot  Skin:     General: Skin is warm  Capillary Refill: Capillary refill takes less than 2 seconds  Neurological:      General: No focal deficit present  Mental Status: She is alert and oriented to person, place, and time  Psychiatric:         Mood and Affect: Mood normal          Behavior: Behavior normal          Thought Content: Thought content normal                Foot injection     Date/Time 6/6/2023 3:55 PM     Performed by  Ingrid Beverly DPM   Authorized by Ingrid Beverly DPM     Universal Protocol   Consent: Verbal consent obtained  Risks and benefits: risks, benefits and alternatives were discussed  Consent given by: patient  Time out: Immediately prior to procedure a \"time out\" was called to verify the correct patient, procedure, equipment, support staff and site/side marked as required  Timeout called at: 6/6/2023 3:55 PM   Patient understanding: patient states understanding of the procedure being performed  Patient consent: the patient's understanding of the procedure matches consent given  Patient identity confirmed: verbally with patient and provided demographic data        Local anesthesia used: yes      Anesthesia: local infiltration     Anesthesia   Local anesthesia used: yes  Local Anesthetic: bupivacaine 0 25% without epinephrine  Anesthetic total: 1 5 mL     Procedure Details   Procedure Notes: The right heel was prepped with alcohol  Ethyl chloride was administered for topical anesthesia  I proceeded to inject 1 5 ml 0 25% bupivacaine plain/0 5 ml kenalog 40  The patient tolerated it well    A sterile Band-Aid was applied " postinjection    Patient tolerance: patient tolerated the procedure well with no immediate complications

## 2023-06-10 PROBLEM — U07.1 COVID-19 VIRUS INFECTION: Status: RESOLVED | Noted: 2022-05-13 | Resolved: 2023-06-10

## 2023-06-11 NOTE — ASSESSMENT & PLAN NOTE
Pt has had it for mths  Pt saw Artkaran Camacho in June 2023 and had injection to the R heel  Pt also to start PT for both feet

## 2023-06-11 NOTE — ASSESSMENT & PLAN NOTE
Stable  Continue Lumigan 0 03% 1 drop to each eye at  per Ophtho  Pt saw them in April 2023 and sees them every 6 months

## 2023-06-12 ENCOUNTER — OFFICE VISIT (OUTPATIENT)
Dept: FAMILY MEDICINE CLINIC | Facility: CLINIC | Age: 48
End: 2023-06-12
Payer: COMMERCIAL

## 2023-06-12 VITALS
TEMPERATURE: 98.3 F | RESPIRATION RATE: 16 BRPM | WEIGHT: 272 LBS | OXYGEN SATURATION: 99 % | HEART RATE: 76 BPM | DIASTOLIC BLOOD PRESSURE: 70 MMHG | SYSTOLIC BLOOD PRESSURE: 120 MMHG | HEIGHT: 69 IN | BODY MASS INDEX: 40.29 KG/M2

## 2023-06-12 DIAGNOSIS — E03.9 ACQUIRED HYPOTHYROIDISM: Primary | ICD-10-CM

## 2023-06-12 DIAGNOSIS — Z13.1 SCREENING FOR DIABETES MELLITUS: ICD-10-CM

## 2023-06-12 DIAGNOSIS — M72.2 PLANTAR FASCIITIS: ICD-10-CM

## 2023-06-12 DIAGNOSIS — H40.9 GLAUCOMA OF BOTH EYES, UNSPECIFIED GLAUCOMA TYPE: ICD-10-CM

## 2023-06-12 DIAGNOSIS — E66.09 CLASS 2 OBESITY DUE TO EXCESS CALORIES WITHOUT SERIOUS COMORBIDITY WITH BODY MASS INDEX (BMI) OF 39.0 TO 39.9 IN ADULT: ICD-10-CM

## 2023-06-12 DIAGNOSIS — Z13.6 SCREENING FOR CARDIOVASCULAR CONDITION: ICD-10-CM

## 2023-06-12 PROCEDURE — 99213 OFFICE O/P EST LOW 20 MIN: CPT | Performed by: FAMILY MEDICINE

## 2023-06-12 RX ORDER — LEVOTHYROXINE SODIUM 0.05 MG/1
50 TABLET ORAL
Qty: 90 TABLET | Refills: 2 | Status: SHIPPED | OUTPATIENT
Start: 2023-06-12

## 2023-06-12 NOTE — PROGRESS NOTES
BMI Counseling: There is no height or weight on file to calculate BMI  The BMI is above normal  Nutrition recommendations include decreasing portion sizes, encouraging healthy choices of fruits and vegetables, consuming healthier snacks and moderation in carbohydrate intake  Exercise recommendations include exercising 3-5 times per week  No pharmacotherapy was ordered  Rationale for BMI follow-up plan is due to patient being overweight or obese  Depression Screening and Follow-up Plan: Patient was screened for depression during today's encounter  They screened negative with a PHQ-2 score of 0  Assessment/Plan:         Problem List Items Addressed This Visit        Endocrine    Hypothyroidism - Primary     TSH 4 425 and Free T4 0 95 in Dec 2022  Continue levothyroxine 50 mcg qd  Relevant Medications    levothyroxine 50 mcg tablet    Other Relevant Orders    TSH, 3rd generation    T4, free       Musculoskeletal and Integument    Plantar fasciitis     Pt has had it for mths  Pt saw Wilmar Anton in June 2023 and had injection to the R heel  Pt also to start PT for both feet  Other    Glaucoma     Stable  Continue Lumigan 0 03% 1 drop to each eye at [facility] per [facility]  Pt saw them in April 2023 and sees them every 6 months  Class 2 obesity due to excess calories without serious comorbidity with body mass index (BMI) of 39 0 to 39 9 in adult     Discussed smaller portions, healthy snacks, and regular exercise  Other Visit Diagnoses     Screening for cardiovascular condition        Relevant Orders    Lipid panel    Screening for diabetes mellitus        Relevant Orders    Glucose, fasting            Subjective:      Patient ID: Haider Carranza is a 46 y o  female  Pt here for follow-up Hypothyroidism, Plantar Fasciitis, Glaucoma  Pt doing ok  No cp/sob  No HA  No abd pain  Pt still has pain in feet  Has had injections by Podiatry and going to start PT  Pt walks dog on regular basis  Eye pressure is good  No new c/o  The following portions of the patient's history were reviewed and updated as appropriate:   Past Medical History:  She has a past medical history of Glaucoma and Hypothyroidism  ,  _______________________________________________________________________  Medical Problems:  does not have any pertinent problems on file ,  _______________________________________________________________________  Past Surgical History:   has a past surgical history that includes Gastric bypass; Hernia repair; and Bariatric Surgery (12/18/12)  ,  _______________________________________________________________________  Family History:  family history includes Cancer in her maternal grandmother; Hyperlipidemia in her maternal grandmother and mother; Hypertension in her maternal grandmother and mother; No Known Problems in her brother, daughter, father, paternal grandmother, and sister; Osteoporosis in her maternal grandmother; Prostate cancer (age of onset: 48) in her maternal grandfather and paternal grandfather; Stomach cancer (age of onset: [de-identified]) in her maternal grandmother; Thyroid disease in her maternal grandmother ,  _______________________________________________________________________  Social History:   reports that she has never smoked  She has never used smokeless tobacco  She reports current alcohol use of about 2 0 standard drinks of alcohol per week  She reports that she does not use drugs  ,  _______________________________________________________________________  Allergies:  has No Known Allergies     _______________________________________________________________________  Current Outpatient Medications   Medication Sig Dispense Refill   • Ascorbic Acid (VITAMIN C) 1000 MG tablet Take 1,000 mg by mouth daily     • cholecalciferol (VITAMIN D3) 1,000 units tablet Take by mouth     • levothyroxine 50 mcg tablet Take 1 tablet (50 mcg total) by mouth daily in the early morning 90 tablet 2   • "Lumigan 0 01 % ophthalmic drops INSTILL ONE DROP IN [facility] EYE AT BEDTIME  • Multiple Vitamins-Minerals (MULTIVITAMIN ADULT PO) Take by mouth     • RESTASIS 0 05 % ophthalmic emulsion INSTILL ONE DROP INTO BOTH EYES EVERY 12 HOURS  6     No current facility-administered medications for this visit      _______________________________________________________________________  Review of Systems   Constitutional: Negative for fatigue and unexpected weight change  Respiratory: Negative for cough, chest tightness and shortness of breath  Cardiovascular: Negative for chest pain and palpitations  Gastrointestinal: Negative for abdominal pain, constipation, diarrhea, nausea and vomiting  Genitourinary: Negative for difficulty urinating  Musculoskeletal: Negative for arthralgias  B/L foot pain   Skin: Negative for rash  Neurological: Negative for dizziness and headaches  Objective:  Vitals:    06/12/23 0742   BP: 120/70   BP Location: Left arm   Patient Position: Sitting   Cuff Size: Large   Pulse: 76   Resp: 16   Temp: 98 3 °F (36 8 °C)   TempSrc: Tympanic   SpO2: 99%   Weight: 123 kg (272 lb)   Height: 5' 9\" (1 753 m)     Body mass index is 40 17 kg/m²  Physical Exam  Vitals and nursing note reviewed  Constitutional:       Appearance: Normal appearance  She is well-developed  She is obese  HENT:      Head: Normocephalic and atraumatic  Cardiovascular:      Rate and Rhythm: Normal rate and regular rhythm  Heart sounds: Normal heart sounds  No murmur heard  Pulmonary:      Effort: Pulmonary effort is normal  No respiratory distress  Breath sounds: Normal breath sounds  No wheezing  Musculoskeletal:      Cervical back: Normal range of motion and neck supple  Right lower leg: No edema  Left lower leg: No edema  Lymphadenopathy:      Cervical: No cervical adenopathy  Neurological:      Mental Status: She is alert and oriented to person, place, and time   " Psychiatric:         Mood and Affect: Mood normal          Behavior: Behavior normal          Thought Content:  Thought content normal          Judgment: Judgment normal

## 2023-06-15 NOTE — PROGRESS NOTES
PT Evaluation     Today's date: 2023  Patient name: Stacia Coreas  : 1975  MRN: [id number]  Referring provider: Karma Mack DPM  Dx:   Encounter Diagnosis     ICD-10-CM    1  Tendinitis of left foot  M77 52 Ambulatory referral to Physical Therapy      2  Plantar fasciitis of right foot  M72 2 Ambulatory referral to Physical Therapy          Start Time: 0804  Stop Time: 0848  Total time in clinic (min): 44 minutes    Assessment  Assessment details: Stacia Coreas is a 46y o  year old female who presents with chronic bilateral foot/ankle pain  Current deficits include decreased ankle AROM, impaired strength of the L ankle, weight bearing intolerance, and activity intolerance  These deficits impair her ability to perform all typical I/ADLs, household tasks, and social/recreational activities  Signs and symptoms are consistent with referring diagnoses of right sided plantar fascitis and left sided tendinopathy  Recommend skilled PT services to address previously stated deficits to promote progress towards PLOF  Patient treated by KEITH Reese under direct PT supervision  Impairments: abnormal gait, abnormal or restricted ROM, activity intolerance, impaired balance, impaired physical strength, lacks appropriate home exercise program, pain with function and weight-bearing intolerance  Understanding of Dx/Px/POC: good   Prognosis: good    Goals  STG (4 weeks):  1  L inversion ROM equal to R to promote improved activity tolerance  2  Able to perform 10 SL heel raises on the L for improved activity tolerance  3  Decrease L foot pain at worst from 7/10 to 5/10 or less for improved QoL  4  Decrease R foot pain at worst from 5/10 to 3/10 or less for improved QoL  LTG (8 weeks):  1  Able to perform 25 SL heel raises on the L for improved activity tolerance  2  Able to walk 2 miles without symptom exacerbation to promote improved community ambulation and independent exercise    3  Able to sleep through the night without waking secondary to pain to promote improved sleep quality  4  Able to ascend/descend 1 flight of stairs without compensation to promote improved household and community ambulation  5  Independent with HEP  Plan  Plan details: Thank you for referring Jalil Almazan to Physical Therapy at [place] [address] and for the opportunity to coordinate care  Patient would benefit from: skilled physical therapy  Planned modality interventions: cryotherapy, electrical stimulation/Russian stimulation, TENS, thermotherapy: hydrocollator packs and unattended electrical stimulation  Planned therapy interventions: abdominal trunk stabilization, activity modification, ADL retraining, balance, balance/weight bearing training, behavior modification, body mechanics training, breathing training, fine motor coordination training, flexibility, functional ROM exercises, gait training, graded activity, graded exercise, graded motor, home exercise program, work reintegration, transfer training, therapeutic training, therapeutic exercise, therapeutic activities, stretching, strengthening, sensory integrative techniques, self care, postural training, patient education, neuromuscular re-education, muscle pump exercises, motor coordination training, Tervino taping, manual therapy, joint mobilization and IADL retraining  Frequency: 2x week (1-2x/week)  Duration in weeks: 8  Plan of Care beginning date: 6/16/2023  Plan of Care expiration date: 8/11/2023  Treatment plan discussed with: patient        Subjective Evaluation    History of Present Illness  Mechanism of injury:   06/16/23:  Jalil Almazan presents to skilled physical therapy with complaints of bilateral foot pain  Denies history of similar symptoms  Patient reports symptoms began in February without any DECLAN  Pain began in the plantar surface of the right foot that was initially relieved with cortisone injection, but symptoms have returned since   Pain in "currently located in the lateral side of the L foot and dorsal surface, proximal to digits 2-5 and on the plantar surface of the R foot  Shruthi reports pain is worse when taking first few steps after sitting/laying and the day after long periods of walking  Generally, pain lessens throughout the day  She has been trying to wear shoes more often instead of walking in her home barefoot  Patient admits to difficulty sleeping secondary to foot pain  Patient denies numbness, tingling, weakness in right foot and left foot  Next follow up appointment with podiatry is scheduled for 7/11/23  Pain  Pain scale: L: 3/10, R: 0/10  Pain scale at lowest: L: 3/10, R: 0/10  Pain scale at highest: L: 7/10, R: 5/10  Location: top of L foot, R heel  Quality: R: pressure, \"standing on a peeble\" L: achey  Relieving factors: ice  Aggravating factors: standing, walking and stair climbing    Social Support  Steps to enter house: yes (1 step)  Stairs in house: yes (2 flights)   Lives in: multiple-level home  Lives with: significant other    Employment status: working ()    Diagnostic Tests  X-ray: normal  Treatments  Previous treatment: injection treatment  Current treatment: physical therapy  Patient Goals  Patient goals for therapy: decreased pain, increased motion and increased strength  Patient goal: \"lessen the pain\"         Objective     Palpation     Additional Palpation Details  L: TTPs in extensor digitorum insertion and muscle belly, muscle belly of peroneals  R: TTPs in plantar fascia insertion    Tenderness     Right Ankle/Foot   Tenderness in the plantar fascia       Active Range of Motion   Left Ankle/Foot   Dorsiflexion (ke): 0 degrees with pain  Plantar flexion: 50 degrees with pain  Inversion: 10 degrees   Eversion: 20 degrees with pain    Right Ankle/Foot   Dorsiflexion (ke): 5 degrees   Plantar flexion: 40 degrees   Inversion: 25 degrees   Eversion: 20 degrees     Joint Play   Left Ankle/Foot  Joints within " "functional limits are the midfoot  Hypomobile in the forefoot  Right Ankle/Foot  Joints within functional limits are the midfoot and forefoot       Strength/Myotome Testing     Left Ankle/Foot   Dorsiflexion: 5  Plantar flexion: 3- (pain)  Inversion: 5  Eversion: 5    Right Ankle/Foot   Dorsiflexion: 5  Plantar flexion: 5  Inversion: 5  Eversion: 5    Additional Strength Details  R 25 SL heel raises  Unable to complete SL heel raise on the L                Precautions: hypothyroidism     * Indicates part of HEP   HEP code: [id number]       Daily Treatment Diary     Manuals 6/16          L Ankle PROM nv          Forefoot mobility           TC distraction                                 Neuro Re-Ed           NBOS balance           Tandem balance           SL balance           Tandem walking           Walking with head turns           Arch ups* 20x5\" ea          Toe curls with towel           Toe yoga nv          Toe splays nv          Arch leans           Rocker board           SL DB handoff           SL reach to foam roller           SL airplanes                                 Ther Ex           Bike  nv          Bridges           Clamshells           SLR flexion           SLR abduction           Heel raises nv          Toe raises nv          TB DF/PF nv          TB inversion/ eversion nv                     Patient education done                     Ther Activity           Sit to stand           Goblet squat           Leg press           Band resisted side stepping           Monster walks           Forward step ups           Lateral step ups           Deadlift                                                        Gait Training           Single crutch ambulation           Ambulation w/out AD           Stair training                                 Modalities                                             "

## 2023-06-16 ENCOUNTER — EVALUATION (OUTPATIENT)
Dept: PHYSICAL THERAPY | Facility: REHABILITATION | Age: 48
End: 2023-06-16
Payer: COMMERCIAL

## 2023-06-16 DIAGNOSIS — M72.2 PLANTAR FASCIITIS OF RIGHT FOOT: ICD-10-CM

## 2023-06-16 DIAGNOSIS — M77.52 TENDINITIS OF LEFT FOOT: Primary | ICD-10-CM

## 2023-06-16 PROCEDURE — 97112 NEUROMUSCULAR REEDUCATION: CPT | Performed by: PHYSICAL THERAPIST

## 2023-06-16 PROCEDURE — 97161 PT EVAL LOW COMPLEX 20 MIN: CPT | Performed by: PHYSICAL THERAPIST

## 2023-06-20 ENCOUNTER — OFFICE VISIT (OUTPATIENT)
Dept: PHYSICAL THERAPY | Facility: REHABILITATION | Age: 48
End: 2023-06-20
Payer: COMMERCIAL

## 2023-06-20 DIAGNOSIS — M72.2 PLANTAR FASCIITIS OF RIGHT FOOT: ICD-10-CM

## 2023-06-20 DIAGNOSIS — M77.52 TENDINITIS OF LEFT FOOT: Primary | ICD-10-CM

## 2023-06-20 PROCEDURE — 97112 NEUROMUSCULAR REEDUCATION: CPT | Performed by: PHYSICAL THERAPIST

## 2023-06-20 PROCEDURE — 97110 THERAPEUTIC EXERCISES: CPT | Performed by: PHYSICAL THERAPIST

## 2023-06-20 PROCEDURE — 97140 MANUAL THERAPY 1/> REGIONS: CPT | Performed by: PHYSICAL THERAPIST

## 2023-06-20 NOTE — PROGRESS NOTES
"Daily Note     Today's date: 2023  Patient name: Giorgi Crowley  : 1975  MRN: [id number]  Referring provider: Clarence Granados DPM  Dx:   Encounter Diagnosis     ICD-10-CM    1  Tendinitis of left foot  M77 52       2  Plantar fasciitis of right foot  M72 2           Start Time: 1550  Stop Time: 1655  Total time in clinic (min): 65 minutes    Subjective: Jacques Hernández reports improvement in R heel pain since IE on Friday but has started to notice increased N/T in the top of the L foot  Objective: See treatment diary below      Assessment: Tolerated treatment well  Noted L dorsal foot pain with TB plantar flexion that dissipated upon completion of exercise and rest  Patient demonstrated appropriate level of challenge and muscular fatigue throughout session and noted good status at end of visit  Updated and reviewed HEP with patient; patient verbalized and demonstrated understanding of all exercises  Patient demonstrated fatigue post treatment, exhibited good technique with therapeutic exercises and would benefit from continued PT  Plan: Continue per plan of care  Progress treatment as tolerated           Precautions: hypothyroidism     * Indicates part of HEP   HEP code: [id number]     Daily Treatment Diary     Manuals          L Ankle PROM nv done         R plantar fascia STM  done         L peroneal nerve glide  done                               Neuro Re-Ed           Slump slider  15x3\" L         NBOS balance           Tandem balance           SL balance           Tandem walking           Walking with head turns           Arch ups* 20x5\" ea 2x10x5\" ea         Toe curls with towel           Toe yoga nv nv         Toe splays nv 2x10x5\"         Arch leans           Rocker board           SL DB handoff           SL reach to foam roller           SL airplanes                                 Ther Ex           Bike (unbilled) nv 5' lvl 2         Bridges           Clamshells           SLR flexion      " SLR abduction           Heel raises nv 2x10         Toe raises nv 2x10         TB DF/PF nv GTB 2x10         TB inversion/ eversion nv GTB 2x10                    Patient education done                     Ther Activity           Sit to stand           Goblet squat           Leg press           Band resisted side stepping           Monster walks           Forward step ups           Lateral step ups           Deadlift                                                        Gait Training           Single crutch ambulation           Ambulation w/out AD           Stair training                                 Modalities

## 2023-06-22 ENCOUNTER — OFFICE VISIT (OUTPATIENT)
Dept: PHYSICAL THERAPY | Facility: REHABILITATION | Age: 48
End: 2023-06-22
Payer: COMMERCIAL

## 2023-06-22 DIAGNOSIS — M72.2 PLANTAR FASCIITIS OF RIGHT FOOT: ICD-10-CM

## 2023-06-22 DIAGNOSIS — M77.52 TENDINITIS OF LEFT FOOT: Primary | ICD-10-CM

## 2023-06-22 PROCEDURE — 97110 THERAPEUTIC EXERCISES: CPT | Performed by: PHYSICAL THERAPIST

## 2023-06-22 PROCEDURE — 97140 MANUAL THERAPY 1/> REGIONS: CPT | Performed by: PHYSICAL THERAPIST

## 2023-06-22 PROCEDURE — 97112 NEUROMUSCULAR REEDUCATION: CPT | Performed by: PHYSICAL THERAPIST

## 2023-06-22 NOTE — PROGRESS NOTES
"Daily Note     Today's date: 2023  Patient name: Juan C Murillo  : 1975  MRN: [id number]  Referring provider: Jignesh Hyatt DPM  Dx:   Encounter Diagnosis     ICD-10-CM    1  Tendinitis of left foot  M77 52       2  Plantar fasciitis of right foot  M72 2           Start Time: 1359  Stop Time: 1503  Total time in clinic (min): 64 minutes    Subjective: Shruthi denies any recent R heel pain but also reports no changes in L foot pain  Objective: See treatment diary below      Assessment: Tolerated treatment well  Challenged with toe splays R>L with some improvement with increased reps  Patient demonstrated appropriate level of challenge and muscular fatigue throughout session and noted good status at end of visit  Patient demonstrated fatigue post treatment, exhibited good technique with therapeutic exercises and would benefit from continued PT  Plan: Continue per plan of care  Progress treatment as tolerated         Precautions: hypothyroidism     * Indicates part of HEP   HEP code: [id number]     Daily Treatment Diary     Manuals         L Ankle PROM nv done done        R plantar fascia STM  done done        L peroneal nerve glide  done done                              Neuro Re-Ed           Slump slider  15x3\" L 15x3\" ea        Peroneal nerve glide   15x3\" ea        NBOS balance           Tandem balance           SL balance           Tandem walking           Walking with head turns           Arch ups* 20x5\" ea 2x10x5\" ea 3x10x5\" ea        Toe curls with towel           Toe yoga nv nv 2x10 ea        Toe splays nv 2x10x5\" 2x10x5\" ea        Arch leans           Rocker board           SL DB handoff           SL reach to foam roller           SL airplanes                                 Ther Ex           Bike (unbilled) nv 5' lvl 2 5' lvl 2        Bridges           Clamshells           SLR flexion           SLR abduction           Lesser toe extensors stretch   Seated 10x5\" L        Heel " raises nv 2x10 3x10        Toe raises nv 2x10 3x10        TB DF/PF nv GTB 2x10 GTB 20x        TB inversion/ eversion nv GTB 2x10 GTB 20x        TB resisted lesser toe extension   PiTB 2x10 L                              Patient education done                     Ther Activity           Sit to stand           Goblet squat           Leg press           Band resisted side stepping           Monster walks           Forward step ups           Lateral step ups           Deadlift                                                        Gait Training           Single crutch ambulation           Ambulation w/out AD           Stair training                                 Modalities

## 2023-06-23 ENCOUNTER — APPOINTMENT (OUTPATIENT)
Dept: LAB | Facility: CLINIC | Age: 48
End: 2023-06-23
Payer: COMMERCIAL

## 2023-06-23 DIAGNOSIS — E03.9 ACQUIRED HYPOTHYROIDISM: ICD-10-CM

## 2023-06-23 DIAGNOSIS — Z13.1 SCREENING FOR DIABETES MELLITUS: ICD-10-CM

## 2023-06-23 DIAGNOSIS — Z13.6 SCREENING FOR CARDIOVASCULAR CONDITION: ICD-10-CM

## 2023-06-23 LAB
CHOLEST SERPL-MCNC: 169 MG/DL
GLUCOSE P FAST SERPL-MCNC: 86 MG/DL (ref 65–99)
HDLC SERPL-MCNC: 59 MG/DL
LDLC SERPL CALC-MCNC: 93 MG/DL (ref 0–100)
NONHDLC SERPL-MCNC: 110 MG/DL
T4 FREE SERPL-MCNC: 0.88 NG/DL (ref 0.61–1.12)
TRIGL SERPL-MCNC: 86 MG/DL
TSH SERPL DL<=0.05 MIU/L-ACNC: 3.05 UIU/ML (ref 0.45–4.5)

## 2023-06-23 PROCEDURE — 84443 ASSAY THYROID STIM HORMONE: CPT

## 2023-06-23 PROCEDURE — 82947 ASSAY GLUCOSE BLOOD QUANT: CPT

## 2023-06-23 PROCEDURE — 36415 COLL VENOUS BLD VENIPUNCTURE: CPT

## 2023-06-23 PROCEDURE — 80061 LIPID PANEL: CPT

## 2023-06-23 PROCEDURE — 84439 ASSAY OF FREE THYROXINE: CPT

## 2023-06-27 ENCOUNTER — OFFICE VISIT (OUTPATIENT)
Dept: PHYSICAL THERAPY | Facility: REHABILITATION | Age: 48
End: 2023-06-27
Payer: COMMERCIAL

## 2023-06-27 DIAGNOSIS — M72.2 PLANTAR FASCIITIS OF RIGHT FOOT: ICD-10-CM

## 2023-06-27 DIAGNOSIS — M77.52 TENDINITIS OF LEFT FOOT: Primary | ICD-10-CM

## 2023-06-27 PROCEDURE — 97110 THERAPEUTIC EXERCISES: CPT | Performed by: PHYSICAL THERAPIST

## 2023-06-27 PROCEDURE — 97112 NEUROMUSCULAR REEDUCATION: CPT | Performed by: PHYSICAL THERAPIST

## 2023-06-27 PROCEDURE — 97140 MANUAL THERAPY 1/> REGIONS: CPT | Performed by: PHYSICAL THERAPIST

## 2023-06-27 NOTE — PROGRESS NOTES
"Daily Note     Today's date: 2023  Patient name: Nadine Bennett  : 1975  MRN: [id number]  Referring provider: Miles Becker DPM  Dx:   Encounter Diagnosis     ICD-10-CM    1  Tendinitis of left foot  M77 52       2  Plantar fasciitis of right foot  M72 2           Start Time: 1630  Stop Time: 1730  Total time in clinic (min): 60 minutes    Subjective: Shruthi reports increased L dorsal foot pain when standing on her toes; no new complaints since last session  Objective: See treatment diary below  Whelan neuroma squeeze test (+) on R  TTP and symptom provocation with palpation between second and third metatarsal heads  Assessment: Tolerated treatment well  Noted decreased pain during heel raises with shoes on this session compared to shoes off at home  Suspect L dorsal foot pain due to possible whelan's neuroma based on subjective symptom report and testing/palpation  Patient demonstrated appropriate level of challenge and muscular fatigue throughout session and noted good status at end of visit  Patient demonstrated fatigue post treatment, exhibited good technique with therapeutic exercises and would benefit from continued PT  Plan: Continue per plan of care  Progress treatment as tolerated         Precautions: hypothyroidism     * Indicates part of HEP   HEP code: [id number]     Daily Treatment Diary     Manuals        L Ankle PROM nv done done done       R plantar fascia STM  done done        L peroneal nerve glide  done done                              Neuro Re-Ed           Slump slider  15x3\" L 15x3\" ea 15x3\" ea       Peroneal nerve glide   15x3\" ea 15x3\" ea       NBOS balance           Tandem balance           SL balance           Tandem walking           Walking with head turns           Arch ups* 20x5\" ea 2x10x5\" ea 3x10x5\" ea 3x10x5\" ea       Toe curls with towel           Toe yoga nv nv 2x10 ea 3x10       Toe splays nv 2x10x5\" 2x10x5\" ea 2x10x5\"       Arch " "leans           Rocker board           SL DB handoff           SL reach to foam roller           SL airplanes                                 Ther Ex           Bike (unbilled) nv 5' lvl 2 5' lvl 2 5' lvl 2       Bridges           Clamshells           SLR flexion           SLR abduction           Lesser toe extensors stretch   Seated 10x5\" L        Heel raises nv 2x10 3x10 3x10 w/ shoes       Toe raises nv 2x10 3x10 3x10       TB DF/PF nv GTB 2x10 GTB 20x        TB inversion/ eversion nv GTB 2x10 GTB 20x        TB resisted lesser toe extension   PiTB 2x10 L                              Patient education done                     Ther Activity           Sit to stand           Goblet squat           Leg press           Band resisted side stepping           Monster walks           Forward step ups           Lateral step ups           Deadlift                                                        Gait Training           Single crutch ambulation           Ambulation w/out AD           Stair training                                 Modalities                                                "

## 2023-06-29 ENCOUNTER — OFFICE VISIT (OUTPATIENT)
Dept: PHYSICAL THERAPY | Facility: REHABILITATION | Age: 48
End: 2023-06-29
Payer: COMMERCIAL

## 2023-06-29 DIAGNOSIS — M77.52 TENDINITIS OF LEFT FOOT: Primary | ICD-10-CM

## 2023-06-29 DIAGNOSIS — M72.2 PLANTAR FASCIITIS OF RIGHT FOOT: ICD-10-CM

## 2023-06-29 PROCEDURE — 97112 NEUROMUSCULAR REEDUCATION: CPT | Performed by: PHYSICAL THERAPIST

## 2023-06-29 PROCEDURE — 97110 THERAPEUTIC EXERCISES: CPT | Performed by: PHYSICAL THERAPIST

## 2023-06-29 NOTE — PROGRESS NOTES
"Daily Note     Today's date: 2023  Patient name: Merary Matta  : 1975  MRN: [id number]  Referring provider: Honorio Victor DPM  Dx:   Encounter Diagnosis     ICD-10-CM    1  Tendinitis of left foot  M77 52       2  Plantar fasciitis of right foot  M72 2           Start Time: 801  Stop Time: 68  Total time in clinic (min): 43 minutes    Subjective: Pt states her L foot has been feeling worse recently, however, the R foot has been feeling better  She states she has discontinued heel raises in her HEP due to increased pain  Objective: See treatment diary below      Assessment: Pt was able to tolerate all prescribed therex well today with minimal exacerbation of pain  She required verbal cues for toe splays but still had difficulty performing the intervention indicating continued decrease in neuromuscular control of the foot intrinsics  Manual therapy was deferred as it may have been a contributor to increased pain levels today  Pt was challenged and fatigued at the end of treatment session  Continue to progress pt as tolerated  Patient treated by Caryle Ou, SPT under direct PT supervision  Plan: Continue and progress PoC as tolerated          Precautions: hypothyroidism     * Indicates part of HEP   HEP code: [id number]     Daily Treatment Diary     Manuals       L Ankle PROM nv done done done       R plantar fascia STM  done done        L peroneal nerve glide  done done                              Neuro Re-Ed           Slump slider  15x3\" L 15x3\" ea 15x3\" ea 15x3\" ea      Peroneal nerve glide   15x3\" ea 15x3\" ea 15x3\" ea      NBOS balance           Tandem balance           SL balance           Tandem walking           Walking with head turns           Arch ups* 20x5\" ea 2x10x5\" ea 3x10x5\" ea 3x10x5\" ea 3x10x5\" ea      Toe curls with towel           Toe yoga nv nv 2x10 ea 3x10 3x10      Toe splays nv 2x10x5\" 2x10x5\" ea 2x10x5\" 2x10x5\"      Arch leans         " "  Rocker board           SL DB handoff           SL reach to foam roller           SL airplanes                                 Ther Ex           Bike (unbilled) nv 5' lvl 2 5' lvl 2 5' lvl 2 5' lvl 2      Bridges           Clamshells           SLR flexion           SLR abduction           Lesser toe extensors stretch   Seated 10x5\" L        Heel raises nv 2x10 3x10 3x10 w/ shoes       Toe raises nv 2x10 3x10 3x10       TB DF/PF nv GTB 2x10 GTB 20x  GTB 3x12      TB inversion/ eversion nv GTB 2x10 GTB 20x  GTB 3x12      TB resisted lesser toe extension   PiTB 2x10 L                              Patient education done                     Ther Activity           Sit to stand           Goblet squat           Leg press           Band resisted side stepping           Monster walks           Forward step ups           Lateral step ups           Deadlift                                                        Gait Training           Single crutch ambulation           Ambulation w/out AD           Stair training                                 Modalities                                                  "

## 2023-07-03 ENCOUNTER — OFFICE VISIT (OUTPATIENT)
Dept: PHYSICAL THERAPY | Facility: REHABILITATION | Age: 48
End: 2023-07-03
Payer: COMMERCIAL

## 2023-07-03 DIAGNOSIS — M72.2 PLANTAR FASCIITIS OF RIGHT FOOT: ICD-10-CM

## 2023-07-03 DIAGNOSIS — M77.52 TENDINITIS OF LEFT FOOT: Primary | ICD-10-CM

## 2023-07-03 PROCEDURE — 97110 THERAPEUTIC EXERCISES: CPT | Performed by: PHYSICAL THERAPIST

## 2023-07-03 PROCEDURE — 97112 NEUROMUSCULAR REEDUCATION: CPT | Performed by: PHYSICAL THERAPIST

## 2023-07-03 PROCEDURE — 97140 MANUAL THERAPY 1/> REGIONS: CPT | Performed by: PHYSICAL THERAPIST

## 2023-07-03 NOTE — PROGRESS NOTES
Daily Note     Today's date: 7/3/2023  Patient name: aMckenzie Young  : 1975  MRN: [id number]  Referring provider: Nabil Valencia DPM  Dx:   Encounter Diagnosis     ICD-10-CM    1. Tendinitis of left foot  M77.52       2. Plantar fasciitis of right foot  M72.2           Start Time: 08  Stop Time: 918  Total time in clinic (min): 48 minutes    Subjective: Pt states her L foot "about the same" upon arrival to therapy today. Objective: See treatment diary below      Assessment: Pt was able to tolerate all prescribed therex well today with minimal exacerbation of pain. Patient reporting "tension" with all nerve glides performed today, continues to be challenged by toe splays. Pt was challenged and fatigued at the end of treatment session. Continue to progress pt as tolerated. Plan: Continue and progress PoC as tolerated.         Precautions: hypothyroidism     * Indicates part of HEP   HEP code: [id number]     Daily Treatment Diary     Manuals 6/16 6/20 6/22 6/27 6/29 7/3     L Ankle PROM nv done done done  Done      R plantar fascia STM  done done        L peroneal nerve glide  done done                              Neuro Re-Ed           Slump slider  15x3" L 15x3" ea 15x3" ea 15x3" ea 15x3" ea     Peroneal nerve glide   15x3" ea 15x3" ea 15x3" ea 15x3" ea     NBOS balance           Tandem balance           SL balance           Tandem walking           Walking with head turns           Arch ups* 20x5" ea 2x10x5" ea 3x10x5" ea 3x10x5" ea 3x10x5" ea 3x10x5" ea     Toe curls with towel           Toe yoga nv nv 2x10 ea 3x10 3x10 3x10     Toe splays nv 2x10x5" 2x10x5" ea 2x10x5" 2x10x5" 2x10x5"     Arch leans           Rocker board           SL DB handoff           SL reach to foam roller           SL airplanes                                 Ther Ex           Bike (unbilled) nv 5' lvl 2 5' lvl 2 5' lvl 2 5' lvl 2 5' lvl  2     Bridges           Clamshells           SLR flexion           SLR abduction Lesser toe extensors stretch   Seated 10x5" L        Heel raises nv 2x10 3x10 3x10 w/ shoes  3x10 w/ shoes     Toe raises nv 2x10 3x10 3x10       TB DF/PF nv GTB 2x10 GTB 20x  GTB 3x12 GTB 3x12     TB inversion/ eversion nv GTB 2x10 GTB 20x  GTB 3x12 GTB 3x12     TB resisted lesser toe extension   PiTB 2x10 L                              Patient education done                     Ther Activity           Sit to stand           Goblet squat           Leg press           Band resisted side stepping           Monster walks           Forward step ups           Lateral step ups           Deadlift                                                        Gait Training           Single crutch ambulation           Ambulation w/out AD           Stair training                                 Modalities

## 2023-07-06 ENCOUNTER — EVALUATION (OUTPATIENT)
Dept: PHYSICAL THERAPY | Facility: REHABILITATION | Age: 48
End: 2023-07-06
Payer: COMMERCIAL

## 2023-07-06 DIAGNOSIS — M72.2 PLANTAR FASCIITIS OF RIGHT FOOT: ICD-10-CM

## 2023-07-06 DIAGNOSIS — M77.52 TENDINITIS OF LEFT FOOT: Primary | ICD-10-CM

## 2023-07-06 PROCEDURE — 97110 THERAPEUTIC EXERCISES: CPT | Performed by: PHYSICAL THERAPIST

## 2023-07-06 PROCEDURE — 97112 NEUROMUSCULAR REEDUCATION: CPT | Performed by: PHYSICAL THERAPIST

## 2023-07-06 NOTE — PROGRESS NOTES
PT Re-Evaluation     Today's date: 2023  Patient name: Donna Medrano  : 1975  MRN: [id number]  Referring provider: Nicole Hopson DPM  Dx:   Encounter Diagnosis     ICD-10-CM    1. Tendinitis of left foot  M77.52       2. Plantar fasciitis of right foot  M72.2           Start Time: 0800  Stop Time: 0850  Total time in clinic (min): 50 minutes    Assessment  Assessment details: Per patient report and clinical findings, Donna Medrano has made good progress since starting skilled physical therapy services. Walt Lowery has been compliant with PT POC and HEP since initial evaluation. To this date, Donna Medrano has completed 7 visits of skilled physical therapy. Shruthi demonstrates improvements in objective data however, continues to be limited compared to her prior level of function. Remaining deficits include pain with end range plantarflexion and eversion in the L ankle and increasing sharp pain with resisted plantarflexion and weight bearing in the L ankle. These listed deficits continue to inhibit Shruthi from completing ADLs and participating in social/recreational activities. 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 Recommend continued skilled physical therapy services to address remaining deficits to promote progress towards her prior level of function. Impairments: abnormal gait, abnormal or restricted ROM, activity intolerance, impaired balance, impaired physical strength, lacks appropriate home exercise program, pain with function and weight-bearing intolerance  Understanding of Dx/Px/POC: good   Prognosis: good    Goals  STG (4 weeks):  1. L inversion ROM equal to R to promote improved activity tolerance. - met  2. Able to perform 10 SL heel raises on the L for improved activity tolerance. - met   3. Decrease L foot pain at worst from 7/10 to 5/10 or less for improved QoL. - progressing  4.  Decrease R foot pain at worst from 5/10 to 3/10 or less for improved QoL. - met    LTG (8 weeks):  1. Able to perform 25 SL heel raises on the L for improved activity tolerance. - met   2. Able to walk 2 miles without symptom exacerbation to promote improved community ambulation and independent exercise. - progressing  3. Able to sleep through the night without waking secondary to pain to promote improved sleep quality. - progressing  4. Able to ascend/descend 1 flight of stairs without compensation to promote improved household and community ambulation. - met  5. Independent with HEP. - progressing    Plan  Plan details: Thank you for referring Iainleonor Lamy to Physical Therapy at [facility] and for the opportunity to coordinate care.     Patient would benefit from: skilled physical therapy  Planned modality interventions: cryotherapy, electrical stimulation/Russian stimulation, TENS, thermotherapy: hydrocollator packs and unattended electrical stimulation  Planned therapy interventions: abdominal trunk stabilization, activity modification, ADL retraining, balance, balance/weight bearing training, behavior modification, body mechanics training, breathing training, fine motor coordination training, flexibility, functional ROM exercises, gait training, graded activity, graded exercise, graded motor, home exercise program, work reintegration, transfer training, therapeutic training, therapeutic exercise, therapeutic activities, stretching, strengthening, sensory integrative techniques, self care, postural training, patient education, neuromuscular re-education, muscle pump exercises, motor coordination training, Trevino taping, manual therapy, joint mobilization and IADL retraining  Frequency: 1x week  Duration in weeks: 6  Plan of Care beginning date: 7/6/2023  Plan of Care expiration date: 8/17/2023  Treatment plan discussed with: patient        Subjective Evaluation    History of Present Illness  Mechanism of injury:   07/06/23:  Aura Brink reports feeling she has made 100% progress regarding the R foot and 20% progress regarding the L foot since IE. Shruthi reports improvements in walking on even surfaces, standing on L foot, sleeping with less pain and frequency of waking up, and stair negotiation with a reciprocal pattern. Despite improvements, Shruthi reports continued difficulty with walking on uneven surfaces, prolonged activity on her feet, taking first few steps after waking up, and prescribed stretches dependent upon position. 06/16/23:  Rolly Weiner presents to skilled physical therapy with complaints of bilateral foot pain. Denies history of similar symptoms. Patient reports symptoms began in February without any DECLAN. Pain began in the plantar surface of the right foot that was initially relieved with cortisone injection, but symptoms have returned since. Pain in currently located in the lateral side of the L foot and dorsal surface, proximal to digits 2-5 and on the plantar surface of the R foot. Shruthi reports pain is worse when taking first few steps after sitting/laying and the day after long periods of walking. Generally, pain lessens throughout the day. She has been trying to wear shoes more often instead of walking in her home barefoot. Patient admits to difficulty sleeping secondary to foot pain. Patient denies numbness, tingling, weakness in right foot and left foot. Next follow up appointment with podiatry is scheduled for 7/11/23. Pain  Pain scale: L: 2/10, R: 0/10. Pain scale at lowest: L: 0/10, R: 0/10. Pain scale at highest: L: 8/10, R: 0/10. Location: top of L foot, R heel  Quality: R: pressure, "standing on a peeble" L: achey.   Relieving factors: ice  Aggravating factors: standing, walking and stair climbing    Social Support  Steps to enter house: yes (1 step)  Stairs in house: yes (2 flights)   Lives in: multiple-level home  Lives with: significant other    Employment status: working ()    Diagnostic Tests  X-ray: normal  Treatments  Previous treatment: injection treatment  Current treatment: physical therapy  Patient Goals  Patient goals for therapy: decreased pain, increased motion and increased strength  Patient goal: "lessen the pain"         Objective     Palpation     Additional Palpation Details  L: TTPs in extensor digitorum insertion and muscle belly, muscle belly of peroneals  R: TTPs in plantar fascia insertion    Tenderness     Right Ankle/Foot   Tenderness in the plantar fascia. Active Range of Motion   Left Ankle/Foot   Dorsiflexion (ke): 5 degrees   Plantar flexion: 50 degrees   Inversion: 20 degrees   Eversion: 20 degrees with pain    Right Ankle/Foot   Dorsiflexion (ke): 5 degrees   Plantar flexion: 50 degrees   Inversion: 25 degrees   Eversion: 20 degrees     Joint Play   Left Ankle/Foot  Joints within functional limits are the midfoot. Hypomobile in the forefoot. Right Ankle/Foot  Joints within functional limits are the midfoot and forefoot. Strength/Myotome Testing     Left Ankle/Foot   Dorsiflexion: 5  Plantar flexion: 5 (pain)  Inversion: 5  Eversion: 5    Right Ankle/Foot   Dorsiflexion: 5  Plantar flexion: 5  Inversion: 5  Eversion: 5    Additional Strength Details  R SL heel raise 25x/25x with shoes on  L SL heel raise 25x/25 with increase in L foot pain level from 2/10 to 8/10 with shoes on    Tests   Left Ankle/Foot   Positive for interdigital neuroma.      Additional Tests Details  TTP and symptom provocation with palpation between second and third metatarsal heads    Flowsheet Rows    Flowsheet Row Most Recent Value   PT/OT G-Codes    Current Score 72   Projected Score 73                   Precautions: hypothyroidism     * Indicates part of HEP   HEP code: [id number]     Daily Treatment Diary     Manuals 6/16 6/20 6/22 6/27 6/29 7/3 7/5    L Ankle PROM nv done done done  Done      R plantar fascia STM  done done        L peroneal nerve glide  done done Neuro Re-Ed           Slump slider  15x3" L 15x3" ea 15x3" ea 15x3" ea 15x3" ea 15x3" ea    Peroneal nerve glide   15x3" ea 15x3" ea 15x3" ea 15x3" ea 15x3" ea    NBOS balance           Tandem balance           SL balance           Tandem walking           Walking with head turns           Arch ups* 20x5" ea 2x10x5" ea 3x10x5" ea 3x10x5" ea 3x10x5" ea 3x10x5" ea 25x5" ea    Toe curls with towel           Toe yoga nv nv 2x10 ea 3x10 3x10 3x10 25x ea    Toe splays nv 2x10x5" 2x10x5" ea 2x10x5" 2x10x5" 2x10x5" 20x5" ea    Arch leans       1'x2    Rocker board           SL DB handoff           SL reach to foam roller           SL airplanes                                 Ther Ex           Bike (unbilled) nv 5' lvl 2 5' lvl 2 5' lvl 2 5' lvl 2 5' lvl  2 5' lvl 1    Bridges           Clamshells           SLR flexion           SLR abduction           Lesser toe extensors stretch   Seated 10x5" L        Heel raises nv 2x10 3x10 3x10 w/ shoes  3x10 w/ shoes 25x SL ea w/ shoes    Toe raises nv 2x10 3x10 3x10       TB DF/PF nv GTB 2x10 GTB 20x  GTB 3x12 GTB 3x12 GTB 3x12    TB inversion/ eversion nv GTB 2x10 GTB 20x  GTB 3x12 GTB 3x12 GTB 3x12    TB resisted lesser toe extension   PiTB 2x10 L                              Patient education done                     Ther Activity           Sit to stand           Goblet squat           Leg press           Band resisted side stepping           Monster walks           Forward step ups           Lateral step ups           Deadlift                                                        Gait Training           Single crutch ambulation           Ambulation w/out AD           Stair training                                 Modalities

## 2023-07-11 ENCOUNTER — OFFICE VISIT (OUTPATIENT)
Dept: PODIATRY | Facility: CLINIC | Age: 48
End: 2023-07-11
Payer: COMMERCIAL

## 2023-07-11 VITALS
WEIGHT: 275 LBS | OXYGEN SATURATION: 100 % | BODY MASS INDEX: 40.73 KG/M2 | DIASTOLIC BLOOD PRESSURE: 82 MMHG | HEART RATE: 68 BPM | HEIGHT: 69 IN | SYSTOLIC BLOOD PRESSURE: 127 MMHG

## 2023-07-11 DIAGNOSIS — D36.13 NEUROMA OF FOOT: Primary | ICD-10-CM

## 2023-07-11 PROCEDURE — 64455 NJX AA&/STRD PLTR COM DG NRV: CPT | Performed by: PODIATRIST

## 2023-07-11 PROCEDURE — 99213 OFFICE O/P EST LOW 20 MIN: CPT | Performed by: PODIATRIST

## 2023-07-11 NOTE — PROGRESS NOTES
Assessment/Plan:     The patient's clinical examination today is significant for resolved tenderness to the plantar medial tubercle of the right os calcis. There is no significant tenderness with palpation of the extensor apparatus today. No tenderness with palpation of the EDB muscle and its associated tendons today. Point tenderness is noted between the second and third intermetatarsal spaces that radiates distally and to the forefoot. There is mild tenderness with lateral squeeze of the forefoot. Clinical examination today is consistent with possible neuritis of the second and third intermetatarsal spaces versus intermetatarsal bursitis. Treatment options were discussed with the patient. We discussed continued physical therapy with focus on improving range of motion. We also discussed the importance of good accommodative shoe gear with a roomy toe boxes to prevent impingement of the intermetatarsal nerves. We did proceed with injection therapy of the second and third intermetatarsal spaces with a one-to-one mixture of 0.5 mL of 0.25 Vivacaine plain and 0.5 mL of Depo-Medrol 40, respectively. Continue physical therapy 1 time a week as recommended by her physical therapist.  Recommend follow-up with me in 3 to 4 weeks to ascertain efficacy of injection therapy. There are no diagnoses linked to this encounter. Subjective:     Patient ID: Rolly Weiner is a 46 y.o. female. The patient presents today for follow-up of bilateral foot pain. She notes good resolution of right heel pain since her last visit. She has no discomfort in the right foot today. She notes minimal improvement to her left forefoot pain. Most of the discomfort starts on the top of her left forefoot and extends to the ball. She states that the pain can be present when she is on her feet or off of it. She does note there is some numbness and tingling associated with that at times.       PAST MEDICAL HISTORY:  Past Medical History:   Diagnosis Date   • Glaucoma    • Hypothyroidism        PAST SURGICAL HISTORY:  Past Surgical History:   Procedure Laterality Date   • BARIATRIC SURGERY  12/18/12   • GASTRIC BYPASS      surgery for morbid obesity 12/2012   • HERNIA REPAIR      incisional        ALLERGIES:  Patient has no known allergies. MEDICATIONS:  Current Outpatient Medications   Medication Sig Dispense Refill   • Ascorbic Acid (VITAMIN C) 1000 MG tablet Take 1,000 mg by mouth daily     • cholecalciferol (VITAMIN D3) 1,000 units tablet Take by mouth     • levothyroxine 50 mcg tablet Take 1 tablet (50 mcg total) by mouth daily in the early morning 90 tablet 2   • Lumigan 0.01 % ophthalmic drops INSTILL ONE DROP IN [facility] EYE AT BEDTIME. • Multiple Vitamins-Minerals (MULTIVITAMIN ADULT PO) Take by mouth     • RESTASIS 0.05 % ophthalmic emulsion INSTILL ONE DROP INTO BOTH EYES EVERY 12 HOURS  6     No current facility-administered medications for this visit. SOCIAL HISTORY:  Social History     Socioeconomic History   • Marital status: Single     Spouse name: None   • Number of children: None   • Years of education: None   • Highest education level: None   Occupational History   • Occupation:    Tobacco Use   • Smoking status: Never   • Smokeless tobacco: Never   Vaping Use   • Vaping Use: Never used   Substance and Sexual Activity   • Alcohol use:  Yes     Alcohol/week: 2.0 standard drinks of alcohol     Types: 2 Standard drinks or equivalent per week   • Drug use: No   • Sexual activity: Yes     Partners: Male     Birth control/protection: Male Sterilization     Comment: Pill   Other Topics Concern   • None   Social History Narrative    Non  -White    Exercises regularly     as per [employer]    Primary language - English     Social Determinants of Health     Financial Resource Strain: Not on file   Food Insecurity: Not on file   Transportation Needs: Not on file   Physical Activity: Not on file Stress: Not on file   Social Connections: Not on file   Intimate Partner Violence: Not on file   Housing Stability: Not on file        Review of Systems   Constitutional: Negative. HENT: Negative. Eyes: Negative. Respiratory: Negative. Cardiovascular: Negative. Endocrine: Negative. Musculoskeletal: Negative. Neurological: Negative. Hematological: Negative. Psychiatric/Behavioral: Negative. Objective:     Physical Exam  Constitutional:       Appearance: Normal appearance. HENT:      Head: Normocephalic and atraumatic. Nose: Nose normal.   Cardiovascular:      Pulses:           Dorsalis pedis pulses are 2+ on the left side. Posterior tibial pulses are 2+ on the left side. Pulmonary:      Effort: Pulmonary effort is normal.   Musculoskeletal:        Feet:    Feet:      Left foot:      Skin integrity: Skin integrity normal.      Comments: The patient's clinical examination today is significant for resolved tenderness to the plantar medial tubercle of the right os calcis. There is no significant tenderness with palpation of the extensor apparatus today. No tenderness with palpation of the EDB muscle and its associated tendons today. Point tenderness is noted between the second and third intermetatarsal spaces that radiates distally and to the forefoot. There is mild tenderness with lateral squeeze of the forefoot. Skin:     General: Skin is warm. Capillary Refill: Capillary refill takes less than 2 seconds. Neurological:      General: No focal deficit present. Mental Status: She is alert and oriented to person, place, and time. Psychiatric:         Mood and Affect: Mood normal.         Behavior: Behavior normal.         Thought Content:  Thought content normal.           Nerve block    Date/Time: 7/11/2023 8:45 AM    Performed by: Sam Stratton DPM  Authorized by: Sam Stratton DPM    Patient location:  [facility] Protocol:  Consent: Verbal consent obtained. Risks and benefits: risks, benefits and alternatives were discussed  Consent given by: patient  Time out: Immediately prior to procedure a "time out" was called to verify the correct patient, procedure, equipment, support staff and site/side marked as required. Timeout called at: 7/11/2023 8:45 AM.  Patient understanding: patient states understanding of the procedure being performed  Patient consent: the patient's understanding of the procedure matches consent given  Patient identity confirmed: verbally with patient and provided demographic data      Indications:     Indications:  Pain relief  Location:     Body area:  Lower extremity    Lower extremity nerve:  Plantar    Nerve type:  Peripheral    Laterality:  Left  Pre-procedure details:     Skin preparation:  Alcohol  Skin anesthesia (see MAR for exact dosages):     Skin anesthesia method:  Topical application    Topical anesthesia: ethyl chloride. Procedure details (see MAR for exact dosages): Block needle gauge:  25 G    Anesthetic injected:  Bupivacaine 0.25% w/o epi    Steroid injected:  Triamcinolone    Additive injected:  None    Injection procedure:  Anatomic landmarks identified and introduced needle  Post-procedure details:     Dressing:  Sterile dressing    Patient tolerance of procedure: Tolerated well, no immediate complications  Comments:      After prepping of skin with alcohol, I proceeded with injection therapy of the second and third intermetatarsal spaces with a one-to-one mixture of 0.5 mL of 0.25 Vivacaine plain and 0.5 mL of Depo-Medrol 40, into each intermetatarsal space respectively.

## 2023-07-12 ENCOUNTER — OFFICE VISIT (OUTPATIENT)
Dept: PHYSICAL THERAPY | Facility: REHABILITATION | Age: 48
End: 2023-07-12
Payer: COMMERCIAL

## 2023-07-12 DIAGNOSIS — M77.52 TENDINITIS OF LEFT FOOT: Primary | ICD-10-CM

## 2023-07-12 DIAGNOSIS — M72.2 PLANTAR FASCIITIS OF RIGHT FOOT: ICD-10-CM

## 2023-07-12 PROCEDURE — 97112 NEUROMUSCULAR REEDUCATION: CPT | Performed by: PHYSICAL THERAPIST

## 2023-07-12 PROCEDURE — 97110 THERAPEUTIC EXERCISES: CPT | Performed by: PHYSICAL THERAPIST

## 2023-07-12 NOTE — PROGRESS NOTES
Daily Note     Today's date: 2023  Patient name: Aura Brink  : 1975  MRN: [id number]  Referring provider: Destiny Gallagher DPM  Dx:   Encounter Diagnosis     ICD-10-CM    1. Tendinitis of left foot  M77.52       2. Plantar fasciitis of right foot  M72.2           Start Time: 0700  Stop Time: 0745  Total time in clinic (min): 45 minutes    Subjective: Shruthi reports her feet are feeling much better as she was able to walk her dog for 35 minutes today without discomfort. She visited the podiatrist on 2023 where she was given injections in the second and third intermetatarsal spaces. Objective: See treatment diary below      Assessment: Tolerated treatment well. Reported mild tenderness post treatment possibly due to performing heel raises without shoes. Would benefit from incorporating static/dynamic balance exercises in upcoming session to further promote improved foot intrinsic and extrinsic strength. Patient demonstrated appropriate level of challenge and muscular fatigue throughout session and noted good status at end of visit. Patient demonstrated fatigue post treatment, exhibited good technique with therapeutic exercises and would benefit from continued PT. Patient treated by KEITH Keyes under direct PT supervision. Plan: Continue per plan of care. Progress treatment as tolerated.        Precautions: hypothyroidism     * Indicates part of HEP   HEP code: [id number]     Daily Treatment Diary     Manuals 6/16 6/20 6/22 6/27 6/29 7/3 7/5 7/12   L Ankle PROM nv done done done  Done      R plantar fascia STM  done done        L peroneal nerve glide  done done                              Neuro Re-Ed           Slump slider  15x3" L 15x3" ea 15x3" ea 15x3" ea 15x3" ea 15x3" ea 15x3" ea   Peroneal nerve glide   15x3" ea 15x3" ea 15x3" ea 15x3" ea 15x3" ea 15x3" ea   NBOS balance           Tandem balance           SL balance        nv   Tandem walking        nv   Walking with head turns           Arch ups* 20x5" ea 2x10x5" ea 3x10x5" ea 3x10x5" ea 3x10x5" ea 3x10x5" ea 25x5" ea 25x5" ea   Toe curls with towel           Toe yoga nv nv 2x10 ea 3x10 3x10 3x10 25x ea 25x ea   Toe splays nv 2x10x5" 2x10x5" ea 2x10x5" 2x10x5" 2x10x5" 20x5" ea 20x5" ea   Arch leans       1'x2 1'x2   Rocker board        nv   SL DB handoff           SL reach to foam roller           SL airplanes                                 Ther Ex           Bike (unbilled) nv 5' lvl 2 5' lvl 2 5' lvl 2 5' lvl 2 5' lvl  2 5' lvl 1 5' lvl 1   Bridges           Clamshells           SLR flexion           SLR abduction           Lesser toe extensors stretch   Seated 10x5" L        Heel raises nv 2x10 3x10 3x10 w/ shoes  3x10 w/ shoes 25x SL ea w/ shoes 3x10 w/o shoes   Toe raises nv 2x10 3x10 3x10       TB DF/PF nv GTB 2x10 GTB 20x  GTB 3x12 GTB 3x12 GTB 3x12 GTB 3x12   TB inversion/ eversion nv GTB 2x10 GTB 20x  GTB 3x12 GTB 3x12 GTB 3x12 GTB 3x12   TB resisted lesser toe extension   PiTB 2x10 L                              Patient education done                     Ther Activity           Sit to stand           Goblet squat           Leg press           Band resisted side stepping           Monster walks        2 laps PiTB   Forward step ups           Lateral step ups           Deadlift                                                        Gait Training           Single crutch ambulation           Ambulation w/out AD           Stair training                                 Modalities

## 2023-07-19 ENCOUNTER — OFFICE VISIT (OUTPATIENT)
Dept: PHYSICAL THERAPY | Facility: REHABILITATION | Age: 48
End: 2023-07-19
Payer: COMMERCIAL

## 2023-07-19 DIAGNOSIS — M77.52 TENDINITIS OF LEFT FOOT: Primary | ICD-10-CM

## 2023-07-19 DIAGNOSIS — M72.2 PLANTAR FASCIITIS OF RIGHT FOOT: ICD-10-CM

## 2023-07-19 PROCEDURE — 97110 THERAPEUTIC EXERCISES: CPT | Performed by: PHYSICAL THERAPIST

## 2023-07-19 PROCEDURE — 97112 NEUROMUSCULAR REEDUCATION: CPT | Performed by: PHYSICAL THERAPIST

## 2023-07-19 NOTE — PROGRESS NOTES
Daily Note     Today's date: 2023  Patient name: Jeannett Lombard  : 1975  MRN: [id number]  Referring provider: Evelyn Cifuentes DPM  Dx:   Encounter Diagnosis     ICD-10-CM    1. Tendinitis of left foot  M77.52       2. Plantar fasciitis of right foot  M72.2           Start Time: 0746  Stop Time: 08  Total time in clinic (min): 46 minutes    Subjective: Ceci Farias states her right foot is feeling good but her left foot has a dull ache. Objective: See treatment diary below      Assessment: Tolerated treatment well. Tandem walking and single leg balance added to plan of care to improve proprioception and postural stability. Noted ankle and hip strategy with single leg balance with it being more apparent on the L leg compared to the R. Corrective steps noted during tandem walking but no significant loss of balance. Patient demonstrated appropriate level of challenge and muscular fatigue throughout session and noted good status at end of visit. Patient demonstrated fatigue post treatment, exhibited good technique with therapeutic exercises and would benefit from continued PT. Patient treated by KEITH Tucker under direct PT supervision. Plan: Continue per plan of care. Progress treatment as tolerated.        Precautions: hypothyroidism     * Indicates part of HEP   HEP code: [id number]     Daily Treatment Diary     Manuals 7/19  6/22 6/27 6/29 7/3 7/5 7/12   L Ankle PROM   done done  Done      R plantar fascia STM   done        L peroneal nerve glide   done                              Neuro Re-Ed           Slump slider 15x3" ea  15x3" ea 15x3" ea 15x3" ea 15x3" ea 15x3" ea 15x3" ea   Peroneal nerve glide 15x3" ea  15x3" ea 15x3" ea 15x3" ea 15x3" ea 15x3" ea 15x3" ea   NBOS balance           Tandem balance           SL balance 2x30"       nv   Tandem walking 2 laps       nv   Walking with head turns           Arch ups* 3x10x5" ea  3x10x5" ea 3x10x5" ea 3x10x5" ea 3x10x5" ea 25x5" ea 25x5" ea   Toe curls with towel           Toe yoga 2x10 ea  2x10 ea 3x10 3x10 3x10 25x ea 25x ea   Toe splays 2x10x5" ea  2x10x5" ea 2x10x5" 2x10x5" 2x10x5" 20x5" ea 20x5" ea   Arch leans 1'x2      1'x2 1'x2   Rocker board 10x ea 3-way       nv   SL DB handoff           SL reach to foam roller           SL airplanes                                 Ther Ex           Bike (unbilled) 5' lvl 1  5' lvl 2 5' lvl 2 5' lvl 2 5' lvl  2 5' lvl 1 5' lvl 1   Bridges           Clamshells           SLR flexion           SLR abduction           Lesser toe extensors stretch   Seated 10x5" L        Heel raises 3x10 w/ shoes  3x10 3x10 w/ shoes  3x10 w/ shoes 25x SL ea w/ shoes 3x10 w/o shoes   Toe raises 3x10 w/ shoes  3x10 3x10       TB DF/PF np  GTB 20x  GTB 3x12 GTB 3x12 GTB 3x12 GTB 3x12   TB inversion/ eversion np  GTB 20x  GTB 3x12 GTB 3x12 GTB 3x12 GTB 3x12   TB resisted lesser toe extension   PiTB 2x10 L                              Patient education                      Ther Activity           Sit to stand           Goblet squat           Leg press           Band resisted side stepping           Monster walks 3 laps PiTB  Inc nv       2 laps PiTB   Forward step ups           Lateral step ups           Deadlift                                                        Gait Training           Single crutch ambulation           Ambulation w/out AD           Stair training                                 Modalities

## 2023-07-25 ENCOUNTER — OFFICE VISIT (OUTPATIENT)
Dept: PHYSICAL THERAPY | Facility: REHABILITATION | Age: 48
End: 2023-07-25
Payer: COMMERCIAL

## 2023-07-25 DIAGNOSIS — M77.52 TENDINITIS OF LEFT FOOT: Primary | ICD-10-CM

## 2023-07-25 DIAGNOSIS — M72.2 PLANTAR FASCIITIS OF RIGHT FOOT: ICD-10-CM

## 2023-07-25 PROCEDURE — 97112 NEUROMUSCULAR REEDUCATION: CPT | Performed by: PHYSICAL THERAPIST

## 2023-07-25 PROCEDURE — 97530 THERAPEUTIC ACTIVITIES: CPT | Performed by: PHYSICAL THERAPIST

## 2023-07-25 PROCEDURE — 97110 THERAPEUTIC EXERCISES: CPT | Performed by: PHYSICAL THERAPIST

## 2023-07-25 NOTE — PROGRESS NOTES
Daily Note     Today's date: 2023  Patient name: Genesis Lawson  : 1975   MRN: [id number]  Referring provider: Tri Welsh DPM  Dx:   Encounter Diagnosis     ICD-10-CM    1. Tendinitis of left foot  M77.52       2. Plantar fasciitis of right foot  M72.2           Start Time: 0830  Stop Time: 918  Total time in clinic (min): 48 minutes    Subjective: Shruthi states her left foot has felt better. She was walking 10 miles a day during the weekend and did not have a lot of sitting breaks in between. Objective: See treatment diary below      Assessment: Tolerated treatment well. Attempted toe walking to increase arch support but discontinued secondary to reports of pressure in the L foot. Displayed impaired balance and difficulty controlling eccentric phase of step downs from a 6" step but improved with regression to 4" step. Increased ankle and hip strategy with single leg balance on the L leg compared to the R. Patient demonstrated appropriate level of challenge and muscular fatigue throughout session and noted good status at end of visit. Patient demonstrated fatigue post treatment, exhibited good technique with therapeutic exercises and would benefit from continued PT. Patient treated by KEITH Matthews under direct PT supervision. Plan: Continue per plan of care. Progress treatment as tolerated.        Precautions: hypothyroidism     * Indicates part of HEP   HEP code: [id number]     Daily Treatment Diary     Manuals 7/19 7/25  6/27 6/29 7/3 7/5 7/12   L Ankle PROM    done  Done      R plantar fascia STM           L peroneal nerve glide                                 Neuro Re-Ed           Slump slider 15x3" ea 15x3" ea  15x3" ea 15x3" ea 15x3" ea 15x3" ea 15x3" ea   Peroneal nerve glide 15x3" ea 15x3" ea  15x3" ea 15x3" ea 15x3" ea 15x3" ea 15x3" ea   NBOS balance           Tandem balance           SL balance 2x30" 2x30"      nv   Tandem walking 2 laps 2 laps      nv   Walking with head turns           Arch ups* 3x10x5" ea 3x10x5" ea  3x10x5" ea 3x10x5" ea 3x10x5" ea 25x5" ea 25x5" ea   Toe curls with towel           Toe yoga 2x10 ea 2x10 ea  3x10 3x10 3x10 25x ea 25x ea   Toe splays 2x10x5" ea 2x10x5" ea  2x10x5" 2x10x5" 2x10x5" 20x5" ea 20x5" ea   Arch leans 1'x2 1'x2     1'x2 1'x2   Rocker board 10x ea 3-way 10x ea 3-way      nv   SL DB handoff           SL reach to foam roller           SL airplanes                                 Ther Ex           Bike (unbilled) 5' lvl 1 5' lvl 1  5' lvl 2 5' lvl 2 5' lvl  2 5' lvl 1 5' lvl 1   Bridges           Clamshells           SLR flexion           SLR abduction           Lesser toe extensors stretch           Heel raises 3x10 w/ shoes 3x10 w/ shoes  3x10 w/ shoes  3x10 w/ shoes 25x SL ea w/ shoes 3x10 w/o shoes   Toe raises 3x10 w/ shoes 3x10 w/ shoes  3x10       TB DF/PF np    GTB 3x12 GTB 3x12 GTB 3x12 GTB 3x12   TB inversion/ eversion np    GTB 3x12 GTB 3x12 GTB 3x12 GTB 3x12   TB resisted lesser toe extension                                 Patient education                      Ther Activity           Sit to stand           Goblet squat           Leg press           Band resisted side stepping           Monster walks 3 laps PiTB  Inc nv 3 laps PiTB        2 laps PiTB   Toe Walking  1/2 lap (pain)         Forward step up and over  4" 2x10 R up, L down          Lateral step ups           Deadlift                                                        Gait Training           Single crutch ambulation           Ambulation w/out AD           Stair training                                 Modalities

## 2023-08-01 ENCOUNTER — OFFICE VISIT (OUTPATIENT)
Dept: PODIATRY | Facility: CLINIC | Age: 48
End: 2023-08-01
Payer: COMMERCIAL

## 2023-08-01 VITALS
WEIGHT: 271 LBS | HEIGHT: 69 IN | OXYGEN SATURATION: 100 % | HEART RATE: 66 BPM | SYSTOLIC BLOOD PRESSURE: 127 MMHG | BODY MASS INDEX: 40.14 KG/M2 | DIASTOLIC BLOOD PRESSURE: 88 MMHG

## 2023-08-01 DIAGNOSIS — D36.13 NEUROMA OF FOOT: Primary | ICD-10-CM

## 2023-08-01 DIAGNOSIS — M79.672 LEFT FOOT PAIN: ICD-10-CM

## 2023-08-01 PROCEDURE — 64455 NJX AA&/STRD PLTR COM DG NRV: CPT | Performed by: PODIATRIST

## 2023-08-01 PROCEDURE — 99212 OFFICE O/P EST SF 10 MIN: CPT | Performed by: PODIATRIST

## 2023-08-01 NOTE — PROGRESS NOTES
Assessment/Plan:     Mild tenderness with palpation to the left second intermetatarsal space distally. There is no erythema nor edema nor calor or ecchymosis noted to the forefoot. There is no significant tenderness to lateral squeeze of the left forefoot today. The patient seems to be responding fairly well with injection therapy after the last visit. She rates her pain at about a 5 out of 10 on the pain scale. We did repeat corticosteroid injection therapy today to the second intermetatarsal space. The injection was well-tolerated. Her right heel is doing well and she notes no significant discomfort. She will continue weekly follow-ups with physical therapy. Recommend follow-up in 4 weeks. Diagnoses and all orders for this visit:    Neuroma of foot  -     Nerve block    Left foot pain          Subjective:     Patient ID: Susan Gale is a 46 y.o. female. The patient presents today for follow-up of left forefoot pain secondary to suspected neuroma of the second intermetatarsal space. He is status post injection therapy at her last visit and notes improvement. She has still been going to physical therapy once a week. She states her pain today is at a 5 out of 10 at its worse on the pain scale. PAST MEDICAL HISTORY:  Past Medical History:   Diagnosis Date   • Glaucoma    • Hypothyroidism        PAST SURGICAL HISTORY:  Past Surgical History:   Procedure Laterality Date   • BARIATRIC SURGERY  12/18/12   • GASTRIC BYPASS      surgery for morbid obesity 12/2012   • HERNIA REPAIR      incisional        ALLERGIES:  Patient has no known allergies.     MEDICATIONS:  Current Outpatient Medications   Medication Sig Dispense Refill   • Ascorbic Acid (VITAMIN C) 1000 MG tablet Take 1,000 mg by mouth daily     • cholecalciferol (VITAMIN D3) 1,000 units tablet Take by mouth     • levothyroxine 50 mcg tablet Take 1 tablet (50 mcg total) by mouth daily in the early morning 90 tablet 2   • Lumigan 0.01 % ophthalmic drops INSTILL ONE DROP IN [facility] EYE AT BEDTIME. • Multiple Vitamins-Minerals (MULTIVITAMIN ADULT PO) Take by mouth     • RESTASIS 0.05 % ophthalmic emulsion INSTILL ONE DROP INTO BOTH EYES EVERY 12 HOURS  6     No current facility-administered medications for this visit. SOCIAL HISTORY:  Social History     Socioeconomic History   • Marital status: Single     Spouse name: None   • Number of children: None   • Years of education: None   • Highest education level: None   Occupational History   • Occupation:    Tobacco Use   • Smoking status: Never   • Smokeless tobacco: Never   Vaping Use   • Vaping Use: Never used   Substance and Sexual Activity   • Alcohol use: Yes     Alcohol/week: 2.0 standard drinks of alcohol     Types: 2 Standard drinks or equivalent per week   • Drug use: No   • Sexual activity: Yes     Partners: Male     Birth control/protection: Male Sterilization     Comment: Pill   Other Topics Concern   • None   Social History Narrative    Non  -White    Exercises regularly     as per [employer]    Primary language - English     Social Determinants of Health     Financial Resource Strain: Not on file   Food Insecurity: Not on file   Transportation Needs: Not on file   Physical Activity: Not on file   Stress: Not on file   Social Connections: Not on file   Intimate Partner Violence: Not on file   Housing Stability: Not on file        Review of Systems   Constitutional: Negative. HENT: Negative. Eyes: Negative. Respiratory: Negative. Cardiovascular: Negative. Endocrine: Negative. Musculoskeletal: Negative. Neurological: Negative. Hematological: Negative. Psychiatric/Behavioral: Negative. Objective:     Physical Exam  Constitutional:       Appearance: Normal appearance. HENT:      Head: Normocephalic and atraumatic. Nose: Nose normal.   Cardiovascular:      Pulses:           Dorsalis pedis pulses are 2+ on the left side. Posterior tibial pulses are 2+ on the left side. Pulmonary:      Effort: Pulmonary effort is normal.   Feet:      Left foot:      Skin integrity: Skin integrity normal.      Comments: Mild tenderness with palpation to the left second intermetatarsal space distally. There is no erythema nor edema nor calor or ecchymosis noted to the forefoot. There is no significant tenderness to lateral squeeze of the left forefoot today. Skin:     General: Skin is warm. Capillary Refill: Capillary refill takes less than 2 seconds. Neurological:      General: No focal deficit present. Mental Status: She is alert and oriented to person, place, and time. Psychiatric:         Mood and Affect: Mood normal.         Behavior: Behavior normal.         Thought Content: Thought content normal.           Nerve block    Date/Time: 8/1/2023 8:30 AM    Performed by: Jesús Brown DPM  Authorized by: Jesús Brown DPM    Patient location:  [facility] Protocol:  Consent: Verbal consent obtained. Risks and benefits: risks, benefits and alternatives were discussed  Consent given by: patient  Time out: Immediately prior to procedure a "time out" was called to verify the correct patient, procedure, equipment, support staff and site/side marked as required. Timeout called at: 8/1/2023 8:30 AM.  Patient understanding: patient states understanding of the procedure being performed  Patient consent: the patient's understanding of the procedure matches consent given  Patient identity confirmed: verbally with patient and provided demographic data      Indications:     Indications:  Pain relief  Location:     Body area:  Lower extremity    Lower extremity nerve:  Plantar    Nerve type:  Peripheral    Laterality:  Left  Pre-procedure details:     Skin preparation:  Alcohol  Skin anesthesia (see MAR for exact dosages):     Skin anesthesia method:  Topical application    Topical anesthesia: Ethyl chloride.   Procedure details (see MAR for exact dosages): Block needle gauge:  25 G    Anesthetic injected:  Bupivacaine 0.25% w/o epi    Steroid injected:  Methylprednisolone    Additive injected:  None    Injection procedure:  Anatomic landmarks identified and introduced needle  Post-procedure details:     Dressing:  Sterile dressing    Patient tolerance of procedure: Tolerated well, no immediate complications  Comments: The skin over the left second intermetatarsal space was prepped with alcohol. The area of the intermetatarsal nerve between the second and third metatarsals distally was injected with a mixture of 0.5 mL of 0.25% bupivacaine plain with 0.5 mL of dexamethasone and 0.5 mL of Depo-Medrol 40. The injection was well-tolerated. A sterile Band-Aid was applied postinjection.

## 2023-08-04 ENCOUNTER — OFFICE VISIT (OUTPATIENT)
Dept: PHYSICAL THERAPY | Facility: REHABILITATION | Age: 48
End: 2023-08-04
Payer: COMMERCIAL

## 2023-08-04 DIAGNOSIS — M77.52 TENDINITIS OF LEFT FOOT: Primary | ICD-10-CM

## 2023-08-04 DIAGNOSIS — M72.2 PLANTAR FASCIITIS OF RIGHT FOOT: ICD-10-CM

## 2023-08-04 PROCEDURE — 97110 THERAPEUTIC EXERCISES: CPT | Performed by: PHYSICAL THERAPIST

## 2023-08-04 PROCEDURE — 97112 NEUROMUSCULAR REEDUCATION: CPT | Performed by: PHYSICAL THERAPIST

## 2023-08-04 PROCEDURE — 97530 THERAPEUTIC ACTIVITIES: CPT | Performed by: PHYSICAL THERAPIST

## 2023-08-04 NOTE — PROGRESS NOTES
Daily Note     Today's date: 2023  Patient name: Jennifer Coreas  : 1975  MRN: [id number]  Referring provider: Shelby Murillo DPM  Dx:   Encounter Diagnosis     ICD-10-CM    1. Tendinitis of left foot  M77.52       2. Plantar fasciitis of right foot  M72.2           Start Time: 0758  Stop Time: 08  Total time in clinic (min): 54 minutes    Subjective: Kavita Stratton states her foot is a little sore and bruised after the injection on Tuesday. The pain is getting a lot better, however. Objective: See treatment diary below      Assessment: Tolerated treatment well. Less corrective steps required to regain balance during tandem stance compared to previous sessions. Patient demonstrated appropriate level of challenge and muscular fatigue throughout session and noted good status at end of visit. Patient demonstrated fatigue post treatment and exhibited good technique with therapeutic exercises. After discussion with the patient, she has elected to place therapy on hold as the injection seems to be working. She will continue via HEP. Patient treated by KEITH Griffin under direct PT supervision. Plan: Transition to independent HEP; will follow up with patient as appropriate.       Precautions: hypothyroidism     * Indicates part of HEP   HEP code: [id number]     Daily Treatment Diary     Manuals 7/19 7/25 8/4  6/29 7/3 7/5 7/12   L Ankle PROM      Done      R plantar fascia STM           L peroneal nerve glide                                 Neuro Re-Ed           Slump slider 15x3" ea 15x3" ea 15x3" ea  15x3" ea 15x3" ea 15x3" ea 15x3" ea   Peroneal nerve glide 15x3" ea 15x3" ea 15x3" ea  15x3" ea 15x3" ea 15x3" ea 15x3" ea   NBOS balance           Tandem balance           SL balance 2x30" 2x30" 2x30"     nv   Tandem walking 2 laps 2 laps 2 laps     nv   Walking with head turns           Arch ups* 3x10x5" ea 3x10x5" ea   3x10x5" ea 3x10x5" ea 25x5" ea 25x5" ea   Toe curls with towel           Toe yoga 2x10 ea 2x10 ea 2x10 ea  3x10 3x10 25x ea 25x ea   Toe splays 2x10x5" ea 2x10x5" ea 2x10x5" ea  2x10x5" 2x10x5" 20x5" ea 20x5" ea   Arch leans 1'x2 1'x2 1'x2    1'x2 1'x2   Rocker board 10x ea 3-way 10x ea 3-way 10x ea 3-way     nv   SL DB handoff           SL reach to foam roller           SL airplanes                                 Ther Ex           Bike (unbilled) 5' lvl 1 5' lvl 1 5' lvl 1  5' lvl 2 5' lvl  2 5' lvl 1 5' lvl 1   Bridges           Clamshells           SLR flexion           SLR abduction           Lesser toe extensors stretch           Heel raises 3x10 w/ shoes 3x10 w/ shoes 3x10 w/ shoes   3x10 w/ shoes 25x SL ea w/ shoes 3x10 w/o shoes   Toe raises 3x10 w/ shoes 3x10 w/ shoes 3x10 w/ shoes        TB DF/PF np    GTB 3x12 GTB 3x12 GTB 3x12 GTB 3x12   TB inversion/ eversion np    GTB 3x12 GTB 3x12 GTB 3x12 GTB 3x12   TB resisted lesser toe extension                                 Patient education                      Ther Activity           Sit to stand           Goblet squat           Leg press           Band resisted side stepping           Monster walks 3 laps PiTB  Inc nv 3 laps PiTB   3 laps PiTB     2 laps PiTB   Toe Walking  1/2 lap (pain)         Forward step up and over  4" 2x10 R up, L down  6" 2x10 R up, L down         Lateral step ups           Deadlift                                                        Gait Training           Single crutch ambulation           Ambulation w/out AD           Stair training                                 Modalities

## 2023-08-29 ENCOUNTER — OFFICE VISIT (OUTPATIENT)
Dept: PODIATRY | Facility: CLINIC | Age: 48
End: 2023-08-29
Payer: COMMERCIAL

## 2023-08-29 VITALS
SYSTOLIC BLOOD PRESSURE: 129 MMHG | DIASTOLIC BLOOD PRESSURE: 95 MMHG | HEART RATE: 84 BPM | WEIGHT: 272 LBS | BODY MASS INDEX: 40.29 KG/M2 | OXYGEN SATURATION: 99 % | HEIGHT: 69 IN

## 2023-08-29 DIAGNOSIS — D36.13 NEUROMA OF FOOT: Primary | ICD-10-CM

## 2023-08-29 DIAGNOSIS — M72.2 PLANTAR FASCIITIS OF RIGHT FOOT: ICD-10-CM

## 2023-08-29 PROCEDURE — 99212 OFFICE O/P EST SF 10 MIN: CPT | Performed by: PODIATRIST

## 2023-08-29 NOTE — PROGRESS NOTES
Assessment/Plan:     The patient's clinical examination today is relatively benign. There is minimal tenderness with palpation of the left forefoot today. There is no tenderness to the arches or heels of either foot. There is no erythema nor edema nor calor or ecchymosis to either lower extremity. The patient is doing well from a clinical standpoint. She has responded well to injection therapy. She has completed her course of physical therapy and will continue HEP at home as directed by her therapist.  There were no other acute pedal issues noted today. The patient may follow-up on an as-needed basis. Diagnoses and all orders for this visit:    Neuroma of foot    Plantar fasciitis of right foot          Subjective:     Patient ID: Donell Castaneda is a 46 y.o. female. The patient presents for follow-up today of left forefoot pain secondary to suspected neuritis. She notes good interval improvement with injection therapy. She has completed her course of therapy and is continuing exercises at home. Today, she states that her left foot is feeling much better after the last injection, she rates her pain at about a 1 out of 10 on the pain scale. PAST MEDICAL HISTORY:  Past Medical History:   Diagnosis Date   • Glaucoma    • Hypothyroidism        PAST SURGICAL HISTORY:  Past Surgical History:   Procedure Laterality Date   • BARIATRIC SURGERY  12/18/12   • GASTRIC BYPASS      surgery for morbid obesity 12/2012   • HERNIA REPAIR      incisional        ALLERGIES:  Patient has no known allergies. MEDICATIONS:  Current Outpatient Medications   Medication Sig Dispense Refill   • cholecalciferol (VITAMIN D3) 1,000 units tablet Take by mouth     • levothyroxine 50 mcg tablet Take 1 tablet (50 mcg total) by mouth daily in the early morning 90 tablet 2   • Lumigan 0.01 % ophthalmic drops INSTILL ONE DROP IN [facility] EYE AT BEDTIME.      • Multiple Vitamins-Minerals (MULTIVITAMIN ADULT PO) Take by mouth     • RESTASIS 0.05 % ophthalmic emulsion INSTILL ONE DROP INTO BOTH EYES EVERY 12 HOURS  6   • Ascorbic Acid (VITAMIN C) 1000 MG tablet Take 1,000 mg by mouth daily       No current facility-administered medications for this visit. SOCIAL HISTORY:  Social History     Socioeconomic History   • Marital status: Single     Spouse name: None   • Number of children: None   • Years of education: None   • Highest education level: None   Occupational History   • Occupation:    Tobacco Use   • Smoking status: Never   • Smokeless tobacco: Never   Vaping Use   • Vaping Use: Never used   Substance and Sexual Activity   • Alcohol use: Yes     Alcohol/week: 2.0 standard drinks of alcohol     Types: 2 Standard drinks or equivalent per week   • Drug use: No   • Sexual activity: Yes     Partners: Male     Birth control/protection: Male Sterilization     Comment: Pill   Other Topics Concern   • None   Social History Narrative    Non  -White    Exercises regularly     as per [facility]    Primary language - English     Social Determinants of Health     Financial Resource Strain: Not on file   Food Insecurity: Not on file   Transportation Needs: Not on file   Physical Activity: Not on file   Stress: Not on file   Social Connections: Not on file   Intimate Partner Violence: Not on file   Housing Stability: Not on file        Review of Systems   Constitutional: Negative. HENT: Negative. Eyes: Negative. Respiratory: Negative. Cardiovascular: Negative. Endocrine: Negative. Musculoskeletal: Negative. Skin: Negative. Neurological: Negative. Hematological: Negative. Psychiatric/Behavioral: Negative. Objective:     Physical Exam  Constitutional:       Appearance: Normal appearance. HENT:      Head: Normocephalic and atraumatic. Nose: Nose normal.   Cardiovascular:      Pulses:           Dorsalis pedis pulses are 2+ on the right side and 2+ on the left side. Posterior tibial pulses are 2+ on the right side and 2+ on the left side. Pulmonary:      Effort: Pulmonary effort is normal.   Feet:      Right foot:      Skin integrity: Skin integrity normal.      Left foot:      Skin integrity: Skin integrity normal.      Comments: The patient's clinical examination today is relatively benign. There is minimal tenderness with palpation of the left forefoot today. There is no tenderness to the arches or heels of either foot. There is no erythema nor edema nor calor or ecchymosis to either lower extremity. Skin:     General: Skin is warm. Capillary Refill: Capillary refill takes less than 2 seconds. Neurological:      General: No focal deficit present. Mental Status: She is alert and oriented to person, place, and time. Psychiatric:         Mood and Affect: Mood normal.         Behavior: Behavior normal.         Thought Content:  Thought content normal.

## 2023-12-12 ENCOUNTER — OFFICE VISIT (OUTPATIENT)
Dept: FAMILY MEDICINE CLINIC | Facility: CLINIC | Age: 48
End: 2023-12-12
Payer: COMMERCIAL

## 2023-12-12 VITALS
HEIGHT: 69 IN | HEART RATE: 86 BPM | RESPIRATION RATE: 16 BRPM | WEIGHT: 279 LBS | BODY MASS INDEX: 41.32 KG/M2 | OXYGEN SATURATION: 99 % | SYSTOLIC BLOOD PRESSURE: 116 MMHG | DIASTOLIC BLOOD PRESSURE: 80 MMHG

## 2023-12-12 DIAGNOSIS — M72.2 PLANTAR FASCIITIS: ICD-10-CM

## 2023-12-12 DIAGNOSIS — E03.9 ACQUIRED HYPOTHYROIDISM: Primary | ICD-10-CM

## 2023-12-12 DIAGNOSIS — E66.01 MORBID OBESITY DUE TO EXCESS CALORIES (HCC): ICD-10-CM

## 2023-12-12 PROCEDURE — 99213 OFFICE O/P EST LOW 20 MIN: CPT | Performed by: FAMILY MEDICINE

## 2023-12-12 RX ORDER — LEVOTHYROXINE SODIUM 0.05 MG/1
50 TABLET ORAL
Qty: 90 TABLET | Refills: 3 | Status: SHIPPED | OUTPATIENT
Start: 2023-12-12

## 2023-12-12 NOTE — PROGRESS NOTES
Depression Screening and Follow-up Plan: Patient was screened for depression during today's encounter. They screened negative with a PHQ-2 score of 0. Assessment/Plan:         Problem List Items Addressed This Visit        Endocrine    Hypothyroidism - Primary     TSH 3.048 and Free T4 0.88 in June 2023. Continue levothyroxine 50 mcg qd. Relevant Medications    levothyroxine 50 mcg tablet    Other Relevant Orders    TSH, 3rd generation    T4, free       Musculoskeletal and Integument    Plantar fasciitis     Doing better. Patient saw Carmela Anthony. Had injections and physical therapy. Other    Morbid obesity due to excess calories (HCC)     Discussed smaller portions, healthy snacks, and regular exercise. Subjective:      Patient ID: Missy Do is a 46 y.o. female. Patient here for follow-up Hypothyroidism. Patient doing ok. No chest pain or shortness of breath. No headaches. No abdominal pain. Patient was seeing Podiatry for plantar fasciitis and had injections and physical therapy. Doing better now. Has inserts and doing stretches for it. Doesn't want flu shot. The following portions of the patient's history were reviewed and updated as appropriate:   Past Medical History:  She has a past medical history of Glaucoma and Hypothyroidism. ,  _______________________________________________________________________  Medical Problems:  does not have any pertinent problems on file.,  _______________________________________________________________________  Past Surgical History:   has a past surgical history that includes Gastric bypass; Hernia repair; and Bariatric Surgery (12/18/12). ,  _______________________________________________________________________  Family History:  family history includes Cancer in her maternal grandmother; Hyperlipidemia in her maternal grandmother and mother; Hypertension in her maternal grandmother and mother; No Known Problems in her brother, daughter, father, paternal grandmother, and sister; Osteoporosis in her maternal grandmother; Prostate cancer (age of onset: 48) in her maternal grandfather and paternal grandfather; Stomach cancer (age of onset: 80) in her maternal grandmother; Thyroid disease in her maternal grandmother.,  _______________________________________________________________________  Social History:   reports that she has never smoked. She has never used smokeless tobacco. She reports current alcohol use of about 2.0 standard drinks of alcohol per week. She reports that she does not use drugs. ,  _______________________________________________________________________  Allergies:  has No Known Allergies. .  _______________________________________________________________________  Current Outpatient Medications   Medication Sig Dispense Refill   • levothyroxine 50 mcg tablet Take 1 tablet (50 mcg total) by mouth daily in the early morning 90 tablet 3   • Lumigan 0.01 % ophthalmic drops INSTILL ONE DROP IN [facility] EYE AT BEDTIME. • Multiple Vitamins-Minerals (MULTIVITAMIN ADULT PO) Take by mouth     • RESTASIS 0.05 % ophthalmic emulsion INSTILL ONE DROP INTO BOTH EYES EVERY 12 HOURS  6     No current facility-administered medications for this visit.     _______________________________________________________________________  Review of Systems   Constitutional:  Negative for fatigue and unexpected weight change. Respiratory:  Negative for cough, chest tightness and shortness of breath. Cardiovascular:  Negative for chest pain and palpitations. Gastrointestinal:  Negative for abdominal pain, constipation, diarrhea, nausea and vomiting. Genitourinary:  Negative for difficulty urinating. Musculoskeletal:  Negative for arthralgias. Bilateral foot pain   Skin:  Negative for rash. Neurological:  Negative for dizziness and headaches.          Objective:  Vitals:    12/12/23 0746   BP: 116/80   BP Location: Left arm Patient Position: Sitting   Cuff Size: Large   Pulse: 86   Resp: 16   SpO2: 99%   Weight: 127 kg (279 lb)   Height: 5' 9" (1.753 m)     Body mass index is 41.2 kg/m². Physical Exam  Vitals and nursing note reviewed. Constitutional:       Appearance: Normal appearance. She is well-developed. She is obese. HENT:      Head: Normocephalic and atraumatic. Cardiovascular:      Rate and Rhythm: Normal rate and regular rhythm. Heart sounds: Normal heart sounds. No murmur heard. Pulmonary:      Effort: Pulmonary effort is normal. No respiratory distress. Breath sounds: Normal breath sounds. No wheezing. Musculoskeletal:      Cervical back: Normal range of motion and neck supple. Right lower leg: No edema. Left lower leg: No edema. Lymphadenopathy:      Cervical: No cervical adenopathy. Neurological:      Mental Status: She is alert and oriented to person, place, and time. Psychiatric:         Mood and Affect: Mood normal.         Behavior: Behavior normal.         Thought Content:  Thought content normal.         Judgment: Judgment normal.

## 2023-12-29 ENCOUNTER — APPOINTMENT (OUTPATIENT)
Dept: LAB | Facility: HOSPITAL | Age: 48
End: 2023-12-29
Attending: FAMILY MEDICINE
Payer: COMMERCIAL

## 2023-12-29 DIAGNOSIS — E03.9 ACQUIRED HYPOTHYROIDISM: ICD-10-CM

## 2023-12-29 LAB
T4 FREE SERPL-MCNC: 0.89 NG/DL (ref 0.61–1.12)
TSH SERPL DL<=0.05 MIU/L-ACNC: 2.58 UIU/ML (ref 0.45–4.5)

## 2023-12-29 PROCEDURE — 84443 ASSAY THYROID STIM HORMONE: CPT

## 2023-12-29 PROCEDURE — 36415 COLL VENOUS BLD VENIPUNCTURE: CPT

## 2023-12-29 PROCEDURE — 84439 ASSAY OF FREE THYROXINE: CPT

## 2024-01-03 ENCOUNTER — HOSPITAL ENCOUNTER (OUTPATIENT)
Dept: MAMMOGRAPHY | Facility: HOSPITAL | Age: 49
Discharge: HOME | End: 2024-01-03
Payer: COMMERCIAL

## 2024-01-03 VITALS — HEIGHT: 69 IN | BODY MASS INDEX: 41.32 KG/M2 | WEIGHT: 279 LBS

## 2024-01-03 DIAGNOSIS — Z12.31 ENCOUNTER FOR SCREENING MAMMOGRAM FOR MALIGNANT NEOPLASM OF BREAST: ICD-10-CM

## 2024-01-03 PROCEDURE — 77063 BREAST TOMOSYNTHESIS BI: CPT

## 2024-01-03 PROCEDURE — 77067 SCR MAMMO BI INCL CAD: CPT

## 2024-02-10 DIAGNOSIS — Z00.6 ENCOUNTER FOR EXAMINATION FOR NORMAL COMPARISON OR CONTROL IN CLINICAL RESEARCH PROGRAM: ICD-10-CM

## 2024-02-16 ENCOUNTER — APPOINTMENT (OUTPATIENT)
Dept: LAB | Facility: HOSPITAL | Age: 49
End: 2024-02-16

## 2024-02-16 DIAGNOSIS — Z00.6 ENCOUNTER FOR EXAMINATION FOR NORMAL COMPARISON OR CONTROL IN CLINICAL RESEARCH PROGRAM: ICD-10-CM

## 2024-02-16 PROCEDURE — 36415 COLL VENOUS BLD VENIPUNCTURE: CPT

## 2024-03-10 LAB
APOB+LDLR+PCSK9 GENE MUT ANL BLD/T: NOT DETECTED
BRCA1+BRCA2 DEL+DUP + FULL MUT ANL BLD/T: NOT DETECTED
MLH1+MSH2+MSH6+PMS2 GN DEL+DUP+FUL M: NOT DETECTED

## 2024-03-14 NOTE — PROGRESS NOTES
Assessment/Plan   Problem List Items Addressed This Visit    None  Visit Diagnoses       Routine gynecological examination    -  Primary    Menorrhagia with irregular cycle        Relevant Orders    CBC and differential    Iron Panel (Includes Ferritin, Iron Sat%, Iron, and TIBC)    US pelvis complete w transvaginal            Discussion    All questions have been answered to her satisfaction  RTO for APE or sooner if needed  Pap deferred today. We reviewed guidelines. Will plan next year.   We did discuss work up. Will plan CBC & iron panel and pelvic US. Will then plan follow up to discuss treatment options.       Subjective     HPI   Shruthi Wren is a 49 y.o. female who presents for annual well woman exam.     LMP - 3/13/24 ; Periods are irregular. They were coming q 2.5-3 weeks, now coming q 4.5 weeks. They have been getting shorter, although much heavier. She endorses a lot of clotting. Clots the size of a nickel, then 1/2 dollar then tapers off. Will have significant cramping 10/10 at times. She did miss work this past month. Unable to take NSAIDs due to medical history. Has tried Pamperin w Tylenol without ling term relief. Will have back pain and constipation as well.   She did skip 3 mths last spring and was optimistic that periods were tapering off but that has not happened again since.      No vulvar itch/burn; No vaginal itch/burn; No abn discharge or odor; No urinary sx - burning/pain/frequency/hematuria    No concerning breast masses, asymmetry, nipple discharge or bleeding, changes in skin of breast, or breast tenderness bilaterally    No abd/pelvic pain or HAs;     Pt is sexually active in a mutually monog/ sexual relationship x 7 years; No issues with intercourse; She declines sti/hiv/hep testing; Feels safe at home  Current contraception: vasectomy in partner    (+) PCP for routine Bw/care;    Last Pap : 2/17/21 WNL neg HPV  History of abnormal Pap smear: denies   Mammo:1/3/24 BIRADs 2    Abnormal mammo: no biopsy  Colonoscopy: negative cologaurd    Review of Systems   Constitutional: Negative.    Respiratory: Negative.     Gastrointestinal: Negative.    Endocrine: Negative.    Genitourinary:  Positive for menstrual problem.       The following portions of the patient's history were reviewed and updated as appropriate: allergies, current medications, past family history, past medical history, past social history, past surgical history, and problem list.         OB History          1    Para   1    Term   1            AB        Living   1         SAB        IAB        Ectopic        Multiple        Live Births   1                 Past Medical History:   Diagnosis Date    Glaucoma     Hypothyroidism        Past Surgical History:   Procedure Laterality Date    BARIATRIC SURGERY  12    GASTRIC BYPASS      surgery for morbid obesity 2012    HERNIA REPAIR      incisional       Family History   Problem Relation Age of Onset    Hypertension Mother         benign essential    Hyperlipidemia Mother     No Known Problems Father     No Known Problems Sister     No Known Problems Daughter     Hypertension Maternal Grandmother         benign essential    Hyperlipidemia Maternal Grandmother     Stomach cancer Maternal Grandmother 80    Cancer Maternal Grandmother     Osteoporosis Maternal Grandmother     Thyroid disease Maternal Grandmother     Prostate cancer Maternal Grandfather 50    No Known Problems Paternal Grandmother     Prostate cancer Paternal Grandfather 50    No Known Problems Brother     No Known Problems Maternal Aunt     No Known Problems Paternal Aunt        Social History     Socioeconomic History    Marital status: Single     Spouse name: Not on file    Number of children: Not on file    Years of education: Not on file    Highest education level: Not on file   Occupational History    Occupation:    Tobacco Use    Smoking status: Never    Smokeless tobacco:  "Never   Vaping Use    Vaping status: Never Used   Substance and Sexual Activity    Alcohol use: Yes     Alcohol/week: 2.0 standard drinks of alcohol     Types: 2 Standard drinks or equivalent per week    Drug use: No    Sexual activity: Yes     Partners: Male     Birth control/protection: Male Sterilization     Comment: Pill   Other Topics Concern    Not on file   Social History Narrative    Non  -White    Exercises regularly     as per [employer]    Primary language - English     Social Determinants of Health     Financial Resource Strain: Not on file   Food Insecurity: Not on file   Transportation Needs: Not on file   Physical Activity: Not on file   Stress: Not on file   Social Connections: Not on file   Intimate Partner Violence: Not on file   Housing Stability: Not on file         Current Outpatient Medications:     levothyroxine 50 mcg tablet, Take 1 tablet (50 mcg total) by mouth daily in the early morning, Disp: 90 tablet, Rfl: 3    Lumigan 0.01 % ophthalmic drops, INSTILL ONE DROP IN EACH EYE AT BEDTIME., Disp: , Rfl:     Multiple Vitamins-Minerals (MULTIVITAMIN ADULT PO), Take by mouth, Disp: , Rfl:     RESTASIS 0.05 % ophthalmic emulsion, INSTILL ONE DROP INTO BOTH EYES EVERY 12 HOURS, Disp: , Rfl: 6    No Known Allergies    Objective   Vitals:    03/18/24 0724   BP: 130/80   BP Location: Left arm   Patient Position: Sitting   Cuff Size: Large   Weight: 124 kg (274 lb)   Height: 5' 9\" (1.753 m)     Physical Exam  Vitals reviewed.   Constitutional:       Appearance: She is obese.   HENT:      Head: Normocephalic and atraumatic.   Cardiovascular:      Rate and Rhythm: Normal rate and regular rhythm.   Pulmonary:      Effort: Pulmonary effort is normal.      Breath sounds: Normal breath sounds.   Chest:   Breasts:     Breasts are symmetrical.      Right: No swelling, bleeding, inverted nipple, mass, nipple discharge, skin change or tenderness.      Left: No swelling, bleeding, inverted " nipple, mass, nipple discharge, skin change or tenderness.   Abdominal:      General: Abdomen is flat. Bowel sounds are normal.      Palpations: Abdomen is soft.      Tenderness: There is no abdominal tenderness. There is no right CVA tenderness, left CVA tenderness or guarding.   Genitourinary:     General: Normal vulva.      Pubic Area: No rash.       Labia:         Right: No rash, tenderness, lesion or injury.         Left: No rash, tenderness, lesion or injury.       Urethra: No prolapse, urethral pain, urethral swelling or urethral lesion.      Vagina: Normal. No signs of injury and foreign body. No vaginal discharge or erythema.      Cervix: Normal.      Uterus: Normal.       Adnexa: Right adnexa normal and left adnexa normal.   Musculoskeletal:      Cervical back: Neck supple.   Lymphadenopathy:      Upper Body:      Right upper body: No axillary adenopathy.      Left upper body: No axillary adenopathy.   Skin:     General: Skin is warm and dry.   Neurological:      Mental Status: She is alert and oriented to person, place, and time.   Psychiatric:         Mood and Affect: Mood normal.         Behavior: Behavior normal.         Thought Content: Thought content normal.         Judgment: Judgment normal.         There are no Patient Instructions on file for this visit.

## 2024-03-18 ENCOUNTER — OFFICE VISIT (OUTPATIENT)
Dept: OBGYN CLINIC | Facility: CLINIC | Age: 49
End: 2024-03-18
Payer: COMMERCIAL

## 2024-03-18 VITALS
HEIGHT: 69 IN | BODY MASS INDEX: 40.58 KG/M2 | WEIGHT: 274 LBS | SYSTOLIC BLOOD PRESSURE: 130 MMHG | DIASTOLIC BLOOD PRESSURE: 80 MMHG

## 2024-03-18 DIAGNOSIS — N92.1 MENORRHAGIA WITH IRREGULAR CYCLE: ICD-10-CM

## 2024-03-18 DIAGNOSIS — Z01.419 ROUTINE GYNECOLOGICAL EXAMINATION: Primary | ICD-10-CM

## 2024-03-18 PROCEDURE — S0612 ANNUAL GYNECOLOGICAL EXAMINA: HCPCS | Performed by: PHYSICIAN ASSISTANT

## 2024-03-20 ENCOUNTER — APPOINTMENT (OUTPATIENT)
Dept: LAB | Facility: HOSPITAL | Age: 49
End: 2024-03-20
Payer: COMMERCIAL

## 2024-03-20 DIAGNOSIS — N92.1 MENORRHAGIA WITH IRREGULAR CYCLE: ICD-10-CM

## 2024-03-20 LAB
BASOPHILS # BLD AUTO: 0.07 THOUSANDS/ÂΜL (ref 0–0.1)
BASOPHILS NFR BLD AUTO: 1 % (ref 0–1)
EOSINOPHIL # BLD AUTO: 0.16 THOUSAND/ÂΜL (ref 0–0.61)
EOSINOPHIL NFR BLD AUTO: 2 % (ref 0–6)
ERYTHROCYTE [DISTWIDTH] IN BLOOD BY AUTOMATED COUNT: 16.1 % (ref 11.6–15.1)
FERRITIN SERPL-MCNC: 6 NG/ML (ref 11–307)
HCT VFR BLD AUTO: 34.2 % (ref 34.8–46.1)
HGB BLD-MCNC: 10.2 G/DL (ref 11.5–15.4)
IMM GRANULOCYTES # BLD AUTO: 0.03 THOUSAND/UL (ref 0–0.2)
IMM GRANULOCYTES NFR BLD AUTO: 0 % (ref 0–2)
IRON SATN MFR SERPL: 2 % (ref 15–50)
IRON SERPL-MCNC: 15 UG/DL (ref 50–212)
LYMPHOCYTES # BLD AUTO: 1.8 THOUSANDS/ÂΜL (ref 0.6–4.47)
LYMPHOCYTES NFR BLD AUTO: 23 % (ref 14–44)
MCH RBC QN AUTO: 24.1 PG (ref 26.8–34.3)
MCHC RBC AUTO-ENTMCNC: 29.8 G/DL (ref 31.4–37.4)
MCV RBC AUTO: 81 FL (ref 82–98)
MONOCYTES # BLD AUTO: 0.43 THOUSAND/ÂΜL (ref 0.17–1.22)
MONOCYTES NFR BLD AUTO: 6 % (ref 4–12)
NEUTROPHILS # BLD AUTO: 5.39 THOUSANDS/ÂΜL (ref 1.85–7.62)
NEUTS SEG NFR BLD AUTO: 68 % (ref 43–75)
NRBC BLD AUTO-RTO: 0 /100 WBCS
PLATELET # BLD AUTO: 459 THOUSANDS/UL (ref 149–390)
PMV BLD AUTO: 8.8 FL (ref 8.9–12.7)
RBC # BLD AUTO: 4.24 MILLION/UL (ref 3.81–5.12)
TIBC SERPL-MCNC: 698 UG/DL (ref 250–450)
UIBC SERPL-MCNC: 683 UG/DL (ref 155–355)
WBC # BLD AUTO: 7.88 THOUSAND/UL (ref 4.31–10.16)

## 2024-03-20 PROCEDURE — 83550 IRON BINDING TEST: CPT

## 2024-03-20 PROCEDURE — 36415 COLL VENOUS BLD VENIPUNCTURE: CPT

## 2024-03-20 PROCEDURE — 82728 ASSAY OF FERRITIN: CPT

## 2024-03-20 PROCEDURE — 85025 COMPLETE CBC W/AUTO DIFF WBC: CPT

## 2024-03-20 PROCEDURE — 83540 ASSAY OF IRON: CPT

## 2024-03-27 ENCOUNTER — HOSPITAL ENCOUNTER (OUTPATIENT)
Dept: ULTRASOUND IMAGING | Facility: HOSPITAL | Age: 49
Discharge: HOME | End: 2024-03-27
Payer: COMMERCIAL

## 2024-03-27 DIAGNOSIS — N92.1 MENORRHAGIA WITH IRREGULAR CYCLE: ICD-10-CM

## 2024-03-27 PROCEDURE — 76856 US EXAM PELVIC COMPLETE: CPT

## 2024-03-27 PROCEDURE — 76830 TRANSVAGINAL US NON-OB: CPT

## 2024-04-23 ENCOUNTER — OFFICE VISIT (OUTPATIENT)
Dept: OBGYN CLINIC | Facility: CLINIC | Age: 49
End: 2024-04-23
Payer: COMMERCIAL

## 2024-04-23 VITALS
DIASTOLIC BLOOD PRESSURE: 78 MMHG | WEIGHT: 279 LBS | HEIGHT: 69 IN | SYSTOLIC BLOOD PRESSURE: 126 MMHG | BODY MASS INDEX: 41.32 KG/M2

## 2024-04-23 DIAGNOSIS — D50.8 OTHER IRON DEFICIENCY ANEMIA: Primary | ICD-10-CM

## 2024-04-23 PROCEDURE — 99214 OFFICE O/P EST MOD 30 MIN: CPT | Performed by: PHYSICIAN ASSISTANT

## 2024-04-23 NOTE — PROGRESS NOTES
Assessment/Plan:    No problem-specific Assessment & Plan notes found for this encounter.         Problem List Items Addressed This Visit    None  Visit Diagnoses       Other iron deficiency anemia    -  Primary    Relevant Orders    CBC and differential    Iron Panel (Includes Ferritin, Iron Sat%, Iron, and TIBC)            -At this point will plan to repeat CBC and iron studies in a few weeks. If still deficient will consider venofer infusions. We reviewed risks and benefits. Pt h/o gastric surgery so unable to absorb iron as well.   -Given lack of menstrual cycle in the last few months will plan to watch at this point.   -If irregular bleeding and menorrhagia recurs will consider endo metrial biopsy as well.   -If menses resumes and is cyclic again can also consider restart of OCPs as well.      Subjective:      Patient ID: Shruthi Wren is a 49 y.o. female.    Pt for follow up of results.   She noted at her last visit to have menorrhagia. BW indicated ANDRES. US did not show any abnormal findings.   She has been taking OTC iron for the last month at this point.   At this point pt has now not had a period in the last 2 months.   We reviewed importance of limiting menstrual bleeding as well as treating her anemia in relation to the bleeding. We reviewed tx options.   We reviewed ANDRES sx and risks.             The following portions of the patient's history were reviewed and updated as appropriate: She  has a past medical history of Glaucoma and Hypothyroidism.  She   Patient Active Problem List    Diagnosis Date Noted    Plantar fasciitis 12/12/2022    Morbid obesity due to excess calories (HCC) 12/10/2021    Encounter for annual physical exam 08/25/2020    History of bariatric surgery 08/25/2020    Primary insomnia 08/25/2020    History of syncope 05/20/2020    Hypothyroidism 05/20/2020    Glaucoma 01/20/2016     She  has a past surgical history that includes Gastric bypass; Hernia repair; and Bariatric Surgery  (12/18/12).  Her family history includes Cancer in her maternal grandmother; Hyperlipidemia in her maternal grandmother and mother; Hypertension in her maternal grandmother and mother; No Known Problems in her brother, daughter, father, maternal aunt, paternal aunt, paternal grandmother, and sister; Osteoporosis in her maternal grandmother; Prostate cancer (age of onset: 50) in her maternal grandfather and paternal grandfather; Stomach cancer (age of onset: 80) in her maternal grandmother; Thyroid disease in her maternal grandmother.  She  reports that she has never smoked. She has never used smokeless tobacco. She reports current alcohol use of about 2.0 standard drinks of alcohol per week. She reports that she does not use drugs.  Current Outpatient Medications   Medication Sig Dispense Refill    levothyroxine 50 mcg tablet Take 1 tablet (50 mcg total) by mouth daily in the early morning 90 tablet 3    Lumigan 0.01 % ophthalmic drops INSTILL ONE DROP IN EACH EYE AT BEDTIME.      Multiple Vitamins-Minerals (MULTIVITAMIN ADULT PO) Take by mouth      RESTASIS 0.05 % ophthalmic emulsion INSTILL ONE DROP INTO BOTH EYES EVERY 12 HOURS  6     No current facility-administered medications for this visit.     Current Outpatient Medications on File Prior to Visit   Medication Sig    levothyroxine 50 mcg tablet Take 1 tablet (50 mcg total) by mouth daily in the early morning    Lumigan 0.01 % ophthalmic drops INSTILL ONE DROP IN EACH EYE AT BEDTIME.    Multiple Vitamins-Minerals (MULTIVITAMIN ADULT PO) Take by mouth    RESTASIS 0.05 % ophthalmic emulsion INSTILL ONE DROP INTO BOTH EYES EVERY 12 HOURS     No current facility-administered medications on file prior to visit.     She has No Known Allergies.      Review of Systems   Constitutional: Negative.    Cardiovascular: Negative.    Gastrointestinal: Negative.    Genitourinary: Negative.    Psychiatric/Behavioral: Negative.           Objective:      /78 (BP Location:  "Left arm, Patient Position: Sitting, Cuff Size: Large)   Ht 5' 9\" (1.753 m)   Wt 127 kg (279 lb)   LMP 02/28/2024 (Within Days)   BMI 41.20 kg/m²          Physical Exam  Vitals reviewed.   HENT:      Head: Normocephalic and atraumatic.   Cardiovascular:      Rate and Rhythm: Normal rate.   Pulmonary:      Effort: Pulmonary effort is normal.   Neurological:      Mental Status: She is alert.   Psychiatric:         Mood and Affect: Mood normal.         Behavior: Behavior normal.         Thought Content: Thought content normal.         Judgment: Judgment normal.           "

## 2024-04-29 ENCOUNTER — VBI (OUTPATIENT)
Dept: ADMINISTRATIVE | Facility: OTHER | Age: 49
End: 2024-04-29

## 2024-05-14 ENCOUNTER — APPOINTMENT (OUTPATIENT)
Dept: LAB | Facility: HOSPITAL | Age: 49
End: 2024-05-14
Payer: COMMERCIAL

## 2024-05-14 DIAGNOSIS — D50.8 OTHER IRON DEFICIENCY ANEMIA: ICD-10-CM

## 2024-05-14 LAB
BASOPHILS # BLD AUTO: 0.07 THOUSANDS/ÂΜL (ref 0–0.1)
BASOPHILS NFR BLD AUTO: 1 % (ref 0–1)
EOSINOPHIL # BLD AUTO: 0.26 THOUSAND/ÂΜL (ref 0–0.61)
EOSINOPHIL NFR BLD AUTO: 3 % (ref 0–6)
ERYTHROCYTE [DISTWIDTH] IN BLOOD BY AUTOMATED COUNT: 22.9 % (ref 11.6–15.1)
FERRITIN SERPL-MCNC: 13 NG/ML (ref 11–307)
HCT VFR BLD AUTO: 36.4 % (ref 34.8–46.1)
HGB BLD-MCNC: 11.2 G/DL (ref 11.5–15.4)
IMM GRANULOCYTES # BLD AUTO: 0.07 THOUSAND/UL (ref 0–0.2)
IMM GRANULOCYTES NFR BLD AUTO: 1 % (ref 0–2)
IRON SATN MFR SERPL: 33 % (ref 15–50)
IRON SERPL-MCNC: 133 UG/DL (ref 50–212)
LYMPHOCYTES # BLD AUTO: 1.81 THOUSANDS/ÂΜL (ref 0.6–4.47)
LYMPHOCYTES NFR BLD AUTO: 19 % (ref 14–44)
MCH RBC QN AUTO: 26.9 PG (ref 26.8–34.3)
MCHC RBC AUTO-ENTMCNC: 30.8 G/DL (ref 31.4–37.4)
MCV RBC AUTO: 88 FL (ref 82–98)
MONOCYTES # BLD AUTO: 0.6 THOUSAND/ÂΜL (ref 0.17–1.22)
MONOCYTES NFR BLD AUTO: 6 % (ref 4–12)
NEUTROPHILS # BLD AUTO: 6.99 THOUSANDS/ÂΜL (ref 1.85–7.62)
NEUTS SEG NFR BLD AUTO: 70 % (ref 43–75)
NRBC BLD AUTO-RTO: 0 /100 WBCS
PLATELET # BLD AUTO: 345 THOUSANDS/UL (ref 149–390)
PMV BLD AUTO: 9.4 FL (ref 8.9–12.7)
RBC # BLD AUTO: 4.16 MILLION/UL (ref 3.81–5.12)
TIBC SERPL-MCNC: 399 UG/DL (ref 250–450)
UIBC SERPL-MCNC: 266 UG/DL (ref 155–355)
WBC # BLD AUTO: 9.8 THOUSAND/UL (ref 4.31–10.16)

## 2024-05-14 PROCEDURE — 36415 COLL VENOUS BLD VENIPUNCTURE: CPT

## 2024-05-14 PROCEDURE — 85025 COMPLETE CBC W/AUTO DIFF WBC: CPT

## 2024-05-14 PROCEDURE — 83540 ASSAY OF IRON: CPT

## 2024-05-14 PROCEDURE — 82728 ASSAY OF FERRITIN: CPT

## 2024-05-14 PROCEDURE — 83550 IRON BINDING TEST: CPT

## 2024-06-19 PROBLEM — D50.0 IRON DEFICIENCY ANEMIA DUE TO CHRONIC BLOOD LOSS: Status: ACTIVE | Noted: 2024-06-19

## 2024-06-21 ENCOUNTER — OFFICE VISIT (OUTPATIENT)
Dept: FAMILY MEDICINE CLINIC | Facility: CLINIC | Age: 49
End: 2024-06-21
Payer: COMMERCIAL

## 2024-06-21 VITALS
WEIGHT: 271.2 LBS | SYSTOLIC BLOOD PRESSURE: 114 MMHG | RESPIRATION RATE: 18 BRPM | OXYGEN SATURATION: 98 % | HEART RATE: 74 BPM | DIASTOLIC BLOOD PRESSURE: 80 MMHG | BODY MASS INDEX: 40.17 KG/M2 | TEMPERATURE: 98 F | HEIGHT: 69 IN

## 2024-06-21 DIAGNOSIS — J06.9 UPPER RESPIRATORY TRACT INFECTION, UNSPECIFIED TYPE: Primary | ICD-10-CM

## 2024-06-21 PROCEDURE — 99213 OFFICE O/P EST LOW 20 MIN: CPT

## 2024-06-21 RX ORDER — DEXTROMETHORPHAN HYDROBROMIDE AND PROMETHAZINE HYDROCHLORIDE 15; 6.25 MG/5ML; MG/5ML
5 SYRUP ORAL 4 TIMES DAILY PRN
Qty: 120 ML | Refills: 0 | Status: SHIPPED | OUTPATIENT
Start: 2024-06-21

## 2024-06-21 RX ORDER — AZITHROMYCIN 250 MG/1
TABLET, FILM COATED ORAL
Qty: 6 TABLET | Refills: 0 | Status: SHIPPED | OUTPATIENT
Start: 2024-06-21 | End: 2024-06-25

## 2024-06-21 NOTE — ASSESSMENT & PLAN NOTE
Treat with Z-Chris and Promethazine DM.  Plenty of fluids orally.  Tylenol as needed.  Salt water gargles.  If does not get better or get worse then follow-up with your PCP

## 2024-06-21 NOTE — PROGRESS NOTES
Assessment/Plan:         Problem List Items Addressed This Visit     Upper respiratory tract infection - Primary     Treat with Z-Chris and Promethazine DM.  Plenty of fluids orally.  Tylenol as needed.  Salt water gargles.  If does not get better or get worse then follow-up with your PCP         Relevant Medications    azithromycin (Zithromax) 250 mg tablet    promethazine-dextromethorphan (PHENERGAN-DM) 6.25-15 mg/5 mL oral syrup         Subjective:      Patient ID: Shruthi Wren is a 49 y.o. female.    Patient here for sick visit.  Has symptoms as described in review of system for last 7 days.  Fever was as high as 100.7.  Has nasal congestion earache sore throat and wheezing and cough no chest pain no shortness of breath no headache rest of the symptoms as described in review of system        The following portions of the patient's history were reviewed and updated as appropriate:   Past Medical History:  She has a past medical history of Glaucoma and Hypothyroidism.,  _______________________________________________________________________  Medical Problems:  does not have any pertinent problems on file.,  _______________________________________________________________________  Past Surgical History:   has a past surgical history that includes Gastric bypass; Hernia repair; and Bariatric Surgery (12/18/12).,  _______________________________________________________________________  Family History:  family history includes Cancer in her maternal grandmother; Hyperlipidemia in her maternal grandmother and mother; Hypertension in her maternal grandmother and mother; No Known Problems in her brother, daughter, father, maternal aunt, paternal aunt, paternal grandmother, and sister; Osteoporosis in her maternal grandmother; Prostate cancer (age of onset: 50) in her maternal grandfather and paternal grandfather; Stomach cancer (age of onset: 80) in her maternal grandmother; Thyroid disease in her maternal  grandmother.,  _______________________________________________________________________  Social History:   reports that she has never smoked. She has never used smokeless tobacco. She reports current alcohol use of about 2.0 standard drinks of alcohol per week. She reports that she does not use drugs.,  _______________________________________________________________________  Allergies:  has No Known Allergies..  _______________________________________________________________________  Current Outpatient Medications   Medication Sig Dispense Refill   • azithromycin (Zithromax) 250 mg tablet Take 2 tablets (500 mg total) by mouth daily for 1 day, THEN 1 tablet (250 mg total) daily for 4 days. 6 tablet 0   • levothyroxine 50 mcg tablet Take 1 tablet (50 mcg total) by mouth daily in the early morning 90 tablet 3   • Lumigan 0.01 % ophthalmic drops INSTILL ONE DROP IN EACH EYE AT BEDTIME.     • Multiple Vitamins-Minerals (MULTIVITAMIN ADULT PO) Take by mouth     • promethazine-dextromethorphan (PHENERGAN-DM) 6.25-15 mg/5 mL oral syrup Take 5 mL by mouth 4 (four) times a day as needed for cough 120 mL 0   • RESTASIS 0.05 % ophthalmic emulsion INSTILL ONE DROP INTO BOTH EYES EVERY 12 HOURS  6     No current facility-administered medications for this visit.     _______________________________________________________________________  Review of Systems   Constitutional:  Positive for fever. Negative for chills and fatigue.   HENT:  Positive for congestion, ear pain, postnasal drip, sinus pressure, sinus pain and sore throat.    Respiratory:  Positive for cough and wheezing. Negative for chest tightness and shortness of breath.    Cardiovascular:  Negative for chest pain and leg swelling.   Gastrointestinal:  Negative for abdominal pain.   Genitourinary:  Negative for difficulty urinating.   Musculoskeletal:  Negative for arthralgias and myalgias.   Neurological:  Negative for dizziness, light-headedness and headaches.      "    Objective:  Vitals:    06/21/24 0956   BP: 114/80   BP Location: Left arm   Patient Position: Sitting   Cuff Size: Large   Pulse: 74   Resp: 18   Temp: 98 °F (36.7 °C)   TempSrc: Tympanic   SpO2: 98%   Weight: 123 kg (271 lb 3.2 oz)   Height: 5' 9\" (1.753 m)     Body mass index is 40.05 kg/m².     Physical Exam  Constitutional:       General: She is not in acute distress.     Appearance: She is obese. She is not ill-appearing, toxic-appearing or diaphoretic.   HENT:      Right Ear: No drainage, swelling or tenderness. No middle ear effusion. Tympanic membrane is erythematous.      Left Ear: No drainage, swelling or tenderness.  No middle ear effusion. Tympanic membrane is erythematous.      Ears:      Comments: TM slightly red but not bulging bilaterally     Nose: Congestion present.      Mouth/Throat:      Mouth: Mucous membranes are moist. No oral lesions.      Pharynx: Oropharynx is clear. Uvula midline. Posterior oropharyngeal erythema present. No pharyngeal swelling, oropharyngeal exudate or uvula swelling.      Tonsils: No tonsillar exudate or tonsillar abscesses. 0 on the right. 0 on the left.   Eyes:      Conjunctiva/sclera: Conjunctivae normal.      Pupils: Pupils are equal, round, and reactive to light.   Cardiovascular:      Rate and Rhythm: Normal rate and regular rhythm.      Heart sounds: No murmur heard.  Pulmonary:      Effort: No respiratory distress.      Breath sounds: Wheezing (Occasional end expiratory) present. No rales.   Lymphadenopathy:      Cervical: Cervical adenopathy present.   Neurological:      Mental Status: She is alert.         "

## 2024-06-25 ENCOUNTER — OFFICE VISIT (OUTPATIENT)
Dept: FAMILY MEDICINE CLINIC | Facility: CLINIC | Age: 49
End: 2024-06-25
Payer: COMMERCIAL

## 2024-06-25 VITALS
BODY MASS INDEX: 39.4 KG/M2 | WEIGHT: 266 LBS | HEART RATE: 74 BPM | RESPIRATION RATE: 16 BRPM | SYSTOLIC BLOOD PRESSURE: 110 MMHG | OXYGEN SATURATION: 97 % | HEIGHT: 69 IN | DIASTOLIC BLOOD PRESSURE: 80 MMHG

## 2024-06-25 DIAGNOSIS — Z13.1 SCREENING FOR DIABETES MELLITUS: ICD-10-CM

## 2024-06-25 DIAGNOSIS — E66.01 MORBID OBESITY DUE TO EXCESS CALORIES (HCC): ICD-10-CM

## 2024-06-25 DIAGNOSIS — Z13.6 SCREENING FOR CARDIOVASCULAR CONDITION: ICD-10-CM

## 2024-06-25 DIAGNOSIS — E03.9 ACQUIRED HYPOTHYROIDISM: Primary | ICD-10-CM

## 2024-06-25 DIAGNOSIS — D50.0 IRON DEFICIENCY ANEMIA DUE TO CHRONIC BLOOD LOSS: ICD-10-CM

## 2024-06-25 PROBLEM — J06.9 UPPER RESPIRATORY TRACT INFECTION: Status: RESOLVED | Noted: 2024-06-21 | Resolved: 2024-06-25

## 2024-06-25 PROCEDURE — 99213 OFFICE O/P EST LOW 20 MIN: CPT | Performed by: FAMILY MEDICINE

## 2024-06-25 NOTE — PROGRESS NOTES
Ambulatory Visit  Name: Shruthi Wren      : 1975      MRN: [id number]  Encounter Provider: Felipe Gonzalez MD  Encounter Date: 2024   Encounter department: [facility]    Assessment & Plan   1. Acquired hypothyroidism  Assessment & Plan:  Recheck TSH and Free T4. Continue levothyroxine 50 mcg qd.  Orders:  -     TSH, 3rd generation; Future  -     T4, free; Future  2. Iron deficiency anemia due to chronic blood loss  Assessment & Plan:  Hgb 10.2 and iron low in 2024 due to heavy menses. Patient started on iron pill and levels much better in May 2024.  3. Morbid obesity due to excess calories (HCC)  Assessment & Plan:  Discussed smaller portions, healthy snacks, and regular exercise.  4. Screening for cardiovascular condition  -     Lipid panel; Future  5. Screening for diabetes mellitus  -     Glucose, fasting; Future         History of Present Illness     Patient here for follow-up Hypothyroidism, Anemia. Doing ok. Had URI last week and took zpak. Feeling better. No chest pain or shortness of breath. No headaches. No abdominal pain. Patient walks on regular basis.       Review of Systems   Constitutional:  Negative for fatigue and unexpected weight change.   Respiratory:  Negative for cough, chest tightness and shortness of breath.    Cardiovascular:  Negative for chest pain and palpitations.   Gastrointestinal:  Negative for abdominal pain, constipation, diarrhea, nausea and vomiting.   Genitourinary:  Negative for difficulty urinating.   Musculoskeletal:  Negative for arthralgias.   Skin:  Negative for rash.   Neurological:  Negative for dizziness and headaches.     Past Medical History:   Diagnosis Date   • Glaucoma    • Hypothyroidism      Past Surgical History:   Procedure Laterality Date   • BARIATRIC SURGERY  12   • GASTRIC BYPASS      surgery for morbid obesity 2012   • HERNIA REPAIR      incisional     Family History   Problem Relation Age of Onset   •  Hypertension Mother         benign essential   • Hyperlipidemia Mother    • No Known Problems Father    • No Known Problems Sister    • No Known Problems Daughter    • Hypertension Maternal Grandmother         benign essential   • Hyperlipidemia Maternal Grandmother    • Stomach cancer Maternal Grandmother 80   • Cancer Maternal Grandmother    • Osteoporosis Maternal Grandmother    • Thyroid disease Maternal Grandmother    • Prostate cancer Maternal Grandfather 50   • No Known Problems Paternal Grandmother    • Prostate cancer Paternal Grandfather 50   • No Known Problems Brother    • No Known Problems Maternal Aunt    • No Known Problems Paternal Aunt      Social History     Tobacco Use   • Smoking status: Never   • Smokeless tobacco: Never   Vaping Use   • Vaping status: Never Used   Substance and Sexual Activity   • Alcohol use: Yes     Alcohol/week: 2.0 standard drinks of alcohol     Types: 2 Standard drinks or equivalent per week     Comment: social   • Drug use: No   • Sexual activity: Yes     Partners: Male     Birth control/protection: Male Sterilization     Current Outpatient Medications on File Prior to Visit   Medication Sig   • azithromycin (Zithromax) 250 mg tablet Take 2 tablets (500 mg total) by mouth daily for 1 day, THEN 1 tablet (250 mg total) daily for 4 days.   • levothyroxine 50 mcg tablet Take 1 tablet (50 mcg total) by mouth daily in the early morning   • Lumigan 0.01 % ophthalmic drops INSTILL ONE DROP IN EACH EYE AT BEDTIME.   • Multiple Vitamins-Minerals (MULTIVITAMIN ADULT PO) Take by mouth   • promethazine-dextromethorphan (PHENERGAN-DM) 6.25-15 mg/5 mL oral syrup Take 5 mL by mouth 4 (four) times a day as needed for cough   • RESTASIS 0.05 % ophthalmic emulsion INSTILL ONE DROP INTO BOTH EYES EVERY 12 HOURS     No Known Allergies  Immunization History   Administered Date(s) Administered   • Influenza Injectable, MDCK, Preservative Free, Quadrivalent, 0.5 mL 09/25/2020   • Tdap 08/25/2020  "    Objective     /80 (BP Location: Left arm, Patient Position: Sitting, Cuff Size: Large)   Pulse 74   Resp 16   Ht 5' 9\" (1.753 m)   Wt 121 kg (266 lb)   SpO2 97%   BMI 39.28 kg/m²     Physical Exam  Vitals and nursing note reviewed.   Constitutional:       General: She is not in acute distress.     Appearance: Normal appearance. She is well-developed. She is obese.   Neck:      Vascular: No carotid bruit.   Cardiovascular:      Rate and Rhythm: Normal rate and regular rhythm.      Heart sounds: No murmur heard.  Pulmonary:      Effort: Pulmonary effort is normal. No respiratory distress.      Breath sounds: Normal breath sounds.   Musculoskeletal:      Cervical back: Normal range of motion and neck supple.      Right lower leg: No edema.      Left lower leg: No edema.   Lymphadenopathy:      Cervical: No cervical adenopathy.   Neurological:      Mental Status: She is alert.   Psychiatric:         Mood and Affect: Mood normal.         Behavior: Behavior normal.         Thought Content: Thought content normal.         Judgment: Judgment normal.       Administrative Statements         "

## 2024-06-25 NOTE — ASSESSMENT & PLAN NOTE
Hgb 10.2 and iron low in March 2024 due to heavy menses. Patient started on iron pill and levels much better in May 2024.

## 2024-07-05 ENCOUNTER — HOSPITAL ENCOUNTER (OUTPATIENT)
Dept: RADIOLOGY | Facility: HOSPITAL | Age: 49
End: 2024-07-05
Payer: COMMERCIAL

## 2024-07-05 DIAGNOSIS — S92.351D CLOSED DISPLACED FRACTURE OF FIFTH METATARSAL BONE OF RIGHT FOOT WITH ROUTINE HEALING, SUBSEQUENT ENCOUNTER: ICD-10-CM

## 2024-07-05 PROCEDURE — 73620 X-RAY EXAM OF FOOT: CPT

## 2024-07-08 ENCOUNTER — APPOINTMENT (OUTPATIENT)
Dept: LAB | Facility: HOSPITAL | Age: 49
End: 2024-07-08
Payer: COMMERCIAL

## 2024-07-08 DIAGNOSIS — E03.9 ACQUIRED HYPOTHYROIDISM: ICD-10-CM

## 2024-07-08 DIAGNOSIS — Z13.1 SCREENING FOR DIABETES MELLITUS: ICD-10-CM

## 2024-07-08 DIAGNOSIS — Z13.6 SCREENING FOR CARDIOVASCULAR CONDITION: ICD-10-CM

## 2024-07-08 LAB
CHOLEST SERPL-MCNC: 180 MG/DL
GLUCOSE P FAST SERPL-MCNC: 85 MG/DL (ref 65–99)
HDLC SERPL-MCNC: 59 MG/DL
LDLC SERPL CALC-MCNC: 103 MG/DL (ref 0–100)
NONHDLC SERPL-MCNC: 121 MG/DL
T4 FREE SERPL-MCNC: 0.72 NG/DL (ref 0.61–1.12)
TRIGL SERPL-MCNC: 92 MG/DL
TSH SERPL DL<=0.05 MIU/L-ACNC: 2.92 UIU/ML (ref 0.45–4.5)

## 2024-07-08 PROCEDURE — 84443 ASSAY THYROID STIM HORMONE: CPT

## 2024-07-08 PROCEDURE — 82947 ASSAY GLUCOSE BLOOD QUANT: CPT

## 2024-07-08 PROCEDURE — 80061 LIPID PANEL: CPT

## 2024-07-08 PROCEDURE — 84439 ASSAY OF FREE THYROXINE: CPT

## 2024-07-08 PROCEDURE — 36415 COLL VENOUS BLD VENIPUNCTURE: CPT

## 2025-01-10 ENCOUNTER — OFFICE VISIT (OUTPATIENT)
Dept: FAMILY MEDICINE CLINIC | Facility: CLINIC | Age: 50
End: 2025-01-10
Payer: COMMERCIAL

## 2025-01-10 VITALS
HEART RATE: 72 BPM | RESPIRATION RATE: 16 BRPM | OXYGEN SATURATION: 99 % | BODY MASS INDEX: 41.32 KG/M2 | HEIGHT: 69 IN | DIASTOLIC BLOOD PRESSURE: 82 MMHG | WEIGHT: 279 LBS | SYSTOLIC BLOOD PRESSURE: 130 MMHG

## 2025-01-10 DIAGNOSIS — Z23 ENCOUNTER FOR IMMUNIZATION: ICD-10-CM

## 2025-01-10 DIAGNOSIS — Z12.31 ENCOUNTER FOR SCREENING MAMMOGRAM FOR BREAST CANCER: ICD-10-CM

## 2025-01-10 DIAGNOSIS — E03.9 ACQUIRED HYPOTHYROIDISM: Primary | ICD-10-CM

## 2025-01-10 DIAGNOSIS — Z00.00 ENCOUNTER FOR ANNUAL PHYSICAL EXAM: ICD-10-CM

## 2025-01-10 DIAGNOSIS — Z82.49 FAMILY HISTORY OF HYPERTROPHIC CARDIOMYOPATHY: ICD-10-CM

## 2025-01-10 DIAGNOSIS — M25.522 LEFT ELBOW PAIN: ICD-10-CM

## 2025-01-10 DIAGNOSIS — D50.0 IRON DEFICIENCY ANEMIA DUE TO CHRONIC BLOOD LOSS: ICD-10-CM

## 2025-01-10 DIAGNOSIS — M25.562 ACUTE PAIN OF LEFT KNEE: ICD-10-CM

## 2025-01-10 DIAGNOSIS — E66.01 MORBID OBESITY DUE TO EXCESS CALORIES (HCC): ICD-10-CM

## 2025-01-10 PROCEDURE — 90471 IMMUNIZATION ADMIN: CPT | Performed by: FAMILY MEDICINE

## 2025-01-10 PROCEDURE — 90750 HZV VACC RECOMBINANT IM: CPT | Performed by: FAMILY MEDICINE

## 2025-01-10 PROCEDURE — 99214 OFFICE O/P EST MOD 30 MIN: CPT | Performed by: FAMILY MEDICINE

## 2025-01-10 PROCEDURE — 99396 PREV VISIT EST AGE 40-64: CPT | Performed by: FAMILY MEDICINE

## 2025-01-10 RX ORDER — LEVOTHYROXINE SODIUM 50 UG/1
50 TABLET ORAL
Qty: 90 TABLET | Refills: 3 | Status: SHIPPED | OUTPATIENT
Start: 2025-01-10

## 2025-01-10 NOTE — ASSESSMENT & PLAN NOTE
CPE done. Last Tdap was in 2020. Recommend flu shot and Shingrix series. Labs are up to date. Last Pap was in February 2021. Pt advised to schedule mammogram. Cologuard was normal in June 2022. Will recheck this year. Pt advised to follow a well balanced, heart healthy diet and to exercise on a regular basis. Not a smoker. Blood pressure good.

## 2025-01-10 NOTE — ASSESSMENT & PLAN NOTE
TSH  2.917 and Free T4 0.72 in July 2024. Continue levothyroxine 50 mcg qd.  Orders:  •  levothyroxine 50 mcg tablet; Take 1 tablet (50 mcg total) by mouth daily in the early morning

## 2025-01-10 NOTE — PROGRESS NOTES
Name: Shruthi Wren      : 1975      MRN: [id number]  Encounter Provider: Felipe Gonzalez MD  Encounter Date: 1/10/2025   Encounter department: [facility]  :  Assessment & Plan  Acquired hypothyroidism  TSH  2.917 and Free T4 0.72 in 2024. Continue levothyroxine 50 mcg qd.  Orders:  •  levothyroxine 50 mcg tablet; Take 1 tablet (50 mcg total) by mouth daily in the early morning    Iron deficiency anemia due to chronic blood loss  Hgb 11.2 and ferritin normal in May 2024.       Morbid obesity due to excess calories (HCC)  Discussed smaller portions, healthy snacks, and regular exercise.       Encounter for annual physical exam  CPE done. Last Tdap was in . Recommend flu shot and Shingrix series. Labs are up to date. Last Pap was in 2021. Pt advised to schedule mammogram. Cologuard was normal in 2022. Will recheck this year. Pt advised to follow a well balanced, heart healthy diet and to exercise on a regular basis. Not a smoker. Blood pressure good.        Encounter for screening mammogram for breast cancer    Orders:  •  Mammo screening bilateral w 3d and cad; Future    Family history of hypertrophic cardiomyopathy  Mom just diagnosed and aunt has it too. Will check echocardiogram.   Orders:  •  Echo complete w/ contrast if indicated; Future    Encounter for immunization    Orders:  •  Zoster Vaccine Recombinant IM    Left elbow pain  Patient likely has Tennis elbow for past 2 months. Patient advised to wear Tennis elbow strap and to ice it as needed. Patient also to use other arm when possible. Can take tylenol as needed. Can't take NSAIDs due to bariatric surgery. Will refer to Orthopedics if no better.        Acute pain of left knee  Patient has had it for 2 days since walking the dog. Likely has strain. Patient to take tylenol and wear knee support. Will refer to Orthopedics if no better.              Depression Screening and Follow-up Plan: Patient was  "screened for depression during today's encounter. They screened negative with a PHQ-2 score of 0.      History of Present Illness     Patient here for physical and follow-up Hypothyroidism, Anemia, Obesity. Patient doing ok. No chest pain or shortness of breath. No headaches. No abdominal pain. Last Tdap was in 2020. Last Pap was in 2021. Needs to schedule mammogram. Cologuard was normal in June 2022. Has left elbow pain for past 2 months. Also has left knee pain for 2 days since was walking dog. Not swollen. Not a smoker. Occasional ETOH use.       Review of Systems   Constitutional:  Negative for activity change, appetite change, fatigue and unexpected weight change.   HENT:  Negative for congestion and sore throat.    Eyes:  Negative for visual disturbance.   Respiratory:  Negative for cough, chest tightness and shortness of breath.    Cardiovascular:  Negative for chest pain, palpitations and leg swelling.   Gastrointestinal:  Negative for abdominal pain, constipation, diarrhea, nausea and vomiting.   Genitourinary:  Negative for difficulty urinating, dysuria, frequency and hematuria.   Musculoskeletal:  Positive for arthralgias. Negative for back pain, gait problem and myalgias.   Skin:  Negative for color change, rash and wound.   Neurological:  Negative for dizziness, weakness, light-headedness, numbness and headaches.   Hematological:  Negative for adenopathy. Does not bruise/bleed easily.   Psychiatric/Behavioral:  Negative for dysphoric mood and sleep disturbance. The patient is not nervous/anxious.        Objective   /82 (BP Location: Left arm, Patient Position: Sitting, Cuff Size: Large)   Pulse 72   Resp 16   Ht 5' 9\" (1.753 m)   Wt 127 kg (279 lb)   SpO2 99%   BMI 41.20 kg/m²      Physical Exam  Vitals and nursing note reviewed.   Constitutional:       General: She is not in acute distress.     Appearance: Normal appearance. She is well-developed. She is obese. She is not ill-appearing. "   HENT:      Head: Normocephalic and atraumatic.      Right Ear: Tympanic membrane, ear canal and external ear normal.      Left Ear: Tympanic membrane, ear canal and external ear normal.      Nose: Nose normal. No congestion or rhinorrhea.      Mouth/Throat:      Mouth: Mucous membranes are moist.      Pharynx: Oropharynx is clear. No posterior oropharyngeal erythema.   Eyes:      Extraocular Movements: Extraocular movements intact.      Conjunctiva/sclera: Conjunctivae normal.      Pupils: Pupils are equal, round, and reactive to light.   Neck:      Thyroid: No thyromegaly.   Cardiovascular:      Rate and Rhythm: Normal rate and regular rhythm.      Heart sounds: Normal heart sounds. No murmur heard.  Pulmonary:      Effort: Pulmonary effort is normal.      Breath sounds: Normal breath sounds. No wheezing or rhonchi.   Abdominal:      General: Abdomen is flat. Bowel sounds are normal.      Palpations: Abdomen is soft.      Tenderness: There is no abdominal tenderness.   Musculoskeletal:         General: Normal range of motion.      Cervical back: Normal range of motion and neck supple.      Comments: Tenderness to palpation left lateral elbow, tenderness to palpation inferior to patella on left side   Lymphadenopathy:      Cervical: No cervical adenopathy.   Skin:     General: Skin is warm and dry.      Capillary Refill: Capillary refill takes less than 2 seconds.      Findings: No rash.   Neurological:      Mental Status: She is alert and oriented to person, place, and time.      Cranial Nerves: No cranial nerve deficit.      Sensory: No sensory deficit.   Psychiatric:         Behavior: Behavior normal.         Thought Content: Thought content normal.         Judgment: Judgment normal.

## 2025-01-10 NOTE — ASSESSMENT & PLAN NOTE
Mom just diagnosed and aunt has it too. Will check echocardiogram.   Orders:  •  Echo complete w/ contrast if indicated; Future

## 2025-01-10 NOTE — ASSESSMENT & PLAN NOTE
Patient has had it for 2 days since walking the dog. Likely has strain. Patient to take tylenol and wear knee support. Will refer to Orthopedics if no better.

## 2025-01-10 NOTE — ASSESSMENT & PLAN NOTE
Patient likely has Tennis elbow for past 2 months. Patient advised to wear Tennis elbow strap and to ice it as needed. Patient also to use other arm when possible. Can take tylenol as needed. Can't take NSAIDs due to bariatric surgery. Will refer to Orthopedics if no better.

## 2025-01-17 ENCOUNTER — HOSPITAL ENCOUNTER (OUTPATIENT)
Dept: MAMMOGRAPHY | Facility: HOSPITAL | Age: 50
Discharge: HOME | End: 2025-01-17
Payer: COMMERCIAL

## 2025-01-17 VITALS — HEIGHT: 69 IN | BODY MASS INDEX: 41.32 KG/M2 | WEIGHT: 279 LBS

## 2025-01-17 DIAGNOSIS — Z12.31 ENCOUNTER FOR SCREENING MAMMOGRAM FOR BREAST CANCER: ICD-10-CM

## 2025-01-17 PROCEDURE — 77063 BREAST TOMOSYNTHESIS BI: CPT

## 2025-01-17 PROCEDURE — 77067 SCR MAMMO BI INCL CAD: CPT

## 2025-01-22 ENCOUNTER — RESULTS FOLLOW-UP (OUTPATIENT)
Dept: FAMILY MEDICINE CLINIC | Facility: CLINIC | Age: 50
End: 2025-01-22

## 2025-01-22 ENCOUNTER — HOSPITAL ENCOUNTER (OUTPATIENT)
Dept: NON INVASIVE DIAGNOSTICS | Facility: HOSPITAL | Age: 50
Discharge: HOME | End: 2025-01-22
Payer: COMMERCIAL

## 2025-01-22 VITALS
SYSTOLIC BLOOD PRESSURE: 130 MMHG | DIASTOLIC BLOOD PRESSURE: 82 MMHG | BODY MASS INDEX: 41.32 KG/M2 | HEIGHT: 69 IN | WEIGHT: 279 LBS | HEART RATE: 72 BPM

## 2025-01-22 DIAGNOSIS — Z82.49 FAMILY HISTORY OF HYPERTROPHIC CARDIOMYOPATHY: ICD-10-CM

## 2025-01-22 LAB
AORTIC ROOT: 3.4 CM
ASCENDING AORTA: 3.4 CM
BSA FOR ECHO PROCEDURE: 2.38 M2
E WAVE DECELERATION TIME: 265 MS
E/A RATIO: 1.22
FRACTIONAL SHORTENING: 38 (ref 28–44)
INTERVENTRICULAR SEPTUM IN DIASTOLE (PARASTERNAL SHORT AXIS VIEW): 1.3 CM
INTERVENTRICULAR SEPTUM: 1.3 CM (ref 0.6–1.1)
LAAS-AP2: 23.8 CM2
LAAS-AP4: 19.3 CM2
LEFT ATRIUM SIZE: 4 CM
LEFT ATRIUM VOLUME (MOD BIPLANE): 66 ML
LEFT ATRIUM VOLUME INDEX (MOD BIPLANE): 27.7 ML/M2
LEFT INTERNAL DIMENSION IN SYSTOLE: 3.3 CM (ref 2.1–4)
LEFT VENTRICULAR INTERNAL DIMENSION IN DIASTOLE: 5.3 CM (ref 3.5–6)
LEFT VENTRICULAR POSTERIOR WALL IN END DIASTOLE: 1.2 CM
LEFT VENTRICULAR STROKE VOLUME: 90 ML
LV EF US.2D.A4C+ESTIMATED: 61 %
LVSV (TEICH): 90 ML
MV E'TISSUE VEL-LAT: 13 CM/S
MV E'TISSUE VEL-SEP: 11 CM/S
MV PEAK A VEL: 0.58 M/S
MV PEAK E VEL: 71 CM/S
MV STENOSIS PRESSURE HALF TIME: 77 MS
MV VALVE AREA P 1/2 METHOD: 2.86
RIGHT ATRIAL 2D VOLUME: 48 ML
RIGHT ATRIUM AREA SYSTOLE A4C: 17 CM2
RIGHT VENTRICLE ID DIMENSION: 3.9 CM
SL CV LEFT ATRIUM LENGTH A2C: 5.9 CM
SL CV LV EF: 60
SL CV PED ECHO LEFT VENTRICLE DIASTOLIC VOLUME (MOD BIPLANE) 2D: 135 ML
SL CV PED ECHO LEFT VENTRICLE SYSTOLIC VOLUME (MOD BIPLANE) 2D: 44 ML
TR MAX PG: 16 MMHG
TR PEAK VELOCITY: 2 M/S
TRICUSPID ANNULAR PLANE SYSTOLIC EXCURSION: 1.8 CM
TRICUSPID VALVE PEAK REGURGITATION VELOCITY: 2 M/S

## 2025-01-22 PROCEDURE — 93306 TTE W/DOPPLER COMPLETE: CPT | Performed by: INTERNAL MEDICINE

## 2025-01-22 PROCEDURE — 93306 TTE W/DOPPLER COMPLETE: CPT

## 2025-03-28 NOTE — PROGRESS NOTES
Assessment & Plan   Problem List Items Addressed This Visit    None  Visit Diagnoses       Well woman exam    -  Primary    Relevant Orders    Liquid-based pap, screening      Perimenopause              Discussion    All questions have been answered to her satisfaction  RTO for APE or sooner if needed  Pap done.       Subjective     HPI   Shruthi Wren is a 50 y.o. female who presents for annual well woman exam.     LMP - 3/12/25 ; Periods have been irregular the last year. They come q 3-7 weeks or so. Some months longer than others. For the most part they are much lighter than what she has had historically although this last cycle was heavy and lasted almost 9 days with spotting to start and end cycle.     No vulvar itch/burn; No vaginal itch/burn; No abn discharge or odor; No urinary sx - burning/pain/frequency/hematuria    No concerning breast masses, asymmetry, nipple discharge or bleeding, changes in skin of breast, or breast tenderness bilaterally    No abd/pelvic pain or HAs;     No problematic menopausal symptoms. She feele her mood/emotional sx have improved.     Pt is sexually active in a mutually monog/ sexual relationship; No issues with intercourse; She declines sti/hiv/hep testing; Feels safe at home  Current contraception: vasectomy    (+) PCP for routine Bw/care;    Last Pap : 21 Wnl neg HPV  History of abnormal Pap smear: denies   Mammo:25 BIRADS 2   Abnormal mammo: denies   Colonoscopy:cologuard 2022, will plan repeat w her PCP this summer.     Review of Systems   Constitutional: Negative.    Respiratory: Negative.     Gastrointestinal: Negative.    Endocrine: Negative.    Genitourinary: Negative.        The following portions of the patient's history were reviewed and updated as appropriate: allergies, current medications, past family history, past medical history, past social history, past surgical history, and problem list.         OB History        1    Para   1    Term    1            AB        Living   1       SAB        IAB        Ectopic        Multiple        Live Births   1                 Past Medical History:   Diagnosis Date   • Difficulty walking Dec 2022    heal pain   • Fallen arches 2022   • Glaucoma    • Hypothyroidism    • Obesity    • Plantar fasciitis 2022    progressively worsened       Past Surgical History:   Procedure Laterality Date   • BARIATRIC SURGERY  12   • GASTRIC BYPASS      surgery for morbid obesity 2012   • HERNIA REPAIR      incisional       Family History   Problem Relation Age of Onset   • Cancer Mother         leiomyosarcoma   • Hypertension Mother         benign essential   • Hyperlipidemia Mother    • Arthritis Mother    • Asthma Mother    • No Known Problems Father    • No Known Problems Sister    • No Known Problems Daughter    • Hypertension Maternal Grandmother         benign essential   • Hyperlipidemia Maternal Grandmother    • Stomach cancer Maternal Grandmother 80   • Cancer Maternal Grandmother    • Osteoporosis Maternal Grandmother    • Thyroid disease Maternal Grandmother    • Glaucoma Maternal Grandmother    • Prostate cancer Maternal Grandfather 50   • No Known Problems Paternal Grandmother    • Prostate cancer Paternal Grandfather 50   • No Known Problems Brother    • No Known Problems Maternal Aunt    • No Known Problems Paternal Aunt        Social History     Socioeconomic History   • Marital status: Single     Spouse name: Not on file   • Number of children: Not on file   • Years of education: Not on file   • Highest education level: Not on file   Occupational History   • Occupation:    Tobacco Use   • Smoking status: Never   • Smokeless tobacco: Never   Vaping Use   • Vaping status: Never Used   Substance and Sexual Activity   • Alcohol use: Yes     Alcohol/week: 2.0 standard drinks of alcohol     Types: 2 Standard drinks or equivalent per week     Comment: social   • Drug use: No   • Sexual  activity: Yes     Partners: Male     Birth control/protection: Male Sterilization   Other Topics Concern   • Not on file   Social History Narrative    Non  -White    Exercises regularly     as per [employer]    Primary language - English     Social Drivers of Health     Financial Resource Strain: Not on file   Food Insecurity: Not on file   Transportation Needs: Not on file   Physical Activity: Not on file   Stress: Not on file   Social Connections: Not on file   Intimate Partner Violence: Not on file   Housing Stability: Not on file         Current Outpatient Medications:   •  levothyroxine 50 mcg tablet, Take 1 tablet (50 mcg total) by mouth daily in the early morning, Disp: 90 tablet, Rfl: 3  •  Multiple Vitamins-Minerals (MULTIVITAMIN ADULT PO), Take by mouth, Disp: , Rfl:   •  RESTASIS 0.05 % ophthalmic emulsion, INSTILL ONE DROP INTO BOTH EYES EVERY 12 HOURS, Disp: , Rfl: 6    No Known Allergies    Objective   Vitals:    04/01/25 0746   BP: 130/84   BP Location: Left arm   Patient Position: Sitting   Cuff Size: Standard   Weight: 129 kg (285 lb)     Physical Exam  Vitals reviewed.   HENT:      Head: Normocephalic and atraumatic.   Cardiovascular:      Rate and Rhythm: Normal rate and regular rhythm.   Pulmonary:      Effort: Pulmonary effort is normal.      Breath sounds: Normal breath sounds.   Chest:   Breasts:     Breasts are symmetrical.      Right: No swelling, bleeding, inverted nipple, mass, nipple discharge, skin change or tenderness.      Left: No swelling, bleeding, inverted nipple, mass, nipple discharge, skin change or tenderness.   Abdominal:      General: Abdomen is flat. Bowel sounds are normal.      Palpations: Abdomen is soft.      Tenderness: There is no abdominal tenderness. There is no right CVA tenderness, left CVA tenderness or guarding.   Genitourinary:     General: Normal vulva.      Pubic Area: No rash.       Labia:         Right: No rash, tenderness, lesion or injury.          Left: No rash, tenderness, lesion or injury.       Urethra: No prolapse, urethral pain, urethral swelling or urethral lesion.      Vagina: Normal. No signs of injury and foreign body. No vaginal discharge or erythema.      Cervix: Normal.      Uterus: Normal.       Adnexa: Right adnexa normal and left adnexa normal.   Musculoskeletal:      Cervical back: Neck supple.   Lymphadenopathy:      Upper Body:      Right upper body: No axillary adenopathy.      Left upper body: No axillary adenopathy.   Skin:     General: Skin is warm and dry.   Neurological:      Mental Status: She is alert and oriented to person, place, and time.   Psychiatric:         Mood and Affect: Mood normal.         Behavior: Behavior normal.         Thought Content: Thought content normal.         Judgment: Judgment normal.         There are no Patient Instructions on file for this visit.

## 2025-04-01 ENCOUNTER — OFFICE VISIT (OUTPATIENT)
Dept: OBGYN CLINIC | Facility: CLINIC | Age: 50
End: 2025-04-01
Payer: COMMERCIAL

## 2025-04-01 VITALS — BODY MASS INDEX: 42.09 KG/M2 | WEIGHT: 285 LBS | DIASTOLIC BLOOD PRESSURE: 84 MMHG | SYSTOLIC BLOOD PRESSURE: 130 MMHG

## 2025-04-01 DIAGNOSIS — Z01.419 WELL WOMAN EXAM: Primary | ICD-10-CM

## 2025-04-01 DIAGNOSIS — N95.1 PERIMENOPAUSE: ICD-10-CM

## 2025-04-01 PROCEDURE — G0476 HPV COMBO ASSAY CA SCREEN: HCPCS | Performed by: PHYSICIAN ASSISTANT

## 2025-04-01 PROCEDURE — G0145 SCR C/V CYTO,THINLAYER,RESCR: HCPCS | Performed by: PHYSICIAN ASSISTANT

## 2025-04-01 PROCEDURE — S0612 ANNUAL GYNECOLOGICAL EXAMINA: HCPCS | Performed by: PHYSICIAN ASSISTANT

## 2025-04-03 ENCOUNTER — RESULTS FOLLOW-UP (OUTPATIENT)
Dept: OBGYN CLINIC | Facility: CLINIC | Age: 50
End: 2025-04-03

## 2025-04-07 LAB
LAB AP GYN PRIMARY INTERPRETATION: NORMAL
Lab: NORMAL

## 2025-04-22 DIAGNOSIS — Z13.6 SCREENING FOR CARDIOVASCULAR CONDITION: ICD-10-CM

## 2025-04-22 DIAGNOSIS — Z82.49 FAMILY HISTORY OF HYPERTROPHIC CARDIOMYOPATHY: Primary | ICD-10-CM

## 2025-05-21 ENCOUNTER — HOSPITAL ENCOUNTER (OUTPATIENT)
Dept: CT IMAGING | Facility: HOSPITAL | Age: 50
Discharge: HOME | End: 2025-05-21
Attending: FAMILY MEDICINE
Payer: COMMERCIAL

## 2025-05-21 DIAGNOSIS — Z82.49 FAMILY HISTORY OF HYPERTROPHIC CARDIOMYOPATHY: ICD-10-CM

## 2025-05-21 DIAGNOSIS — Z13.6 SCREENING FOR CARDIOVASCULAR CONDITION: ICD-10-CM

## 2025-05-21 PROCEDURE — 75571 CT HRT W/O DYE W/CA TEST: CPT

## 2025-05-28 ENCOUNTER — RESULTS FOLLOW-UP (OUTPATIENT)
Dept: FAMILY MEDICINE CLINIC | Facility: CLINIC | Age: 50
End: 2025-05-28

## 2025-05-29 DIAGNOSIS — R93.1 ELEVATED CORONARY ARTERY CALCIUM SCORE: Primary | ICD-10-CM

## 2025-06-04 ENCOUNTER — TELEPHONE (OUTPATIENT)
Age: 50
End: 2025-06-04

## 2025-06-04 NOTE — TELEPHONE ENCOUNTER
"Hello,    The following message was sent via e-mail to the leadership team:     Please advise if you can help facilitate the following overbook request:    Patient Name: Shruthi Wren    Patient MRN: [id number]     Call back #: [phone]     Insurance: [facility]    Department:Cardiology    Speciality: General Cardiology    Reason for overbook request: PATIENT REQUEST    Comments (Write \"N/a\" if no comments): Patient has a referral from Dr Gonzalez for Elevated coronary artery calcium score.  He would like her seen sooner than 9/3/2025.    Requested doctor and location: Any Doctor/[facility]    Date of current appointment: 9/3/2025      Thank you.      "

## 2025-06-05 ENCOUNTER — CONSULT (OUTPATIENT)
Dept: CARDIOLOGY CLINIC | Facility: CLINIC | Age: 50
End: 2025-06-05
Payer: COMMERCIAL

## 2025-06-05 VITALS
DIASTOLIC BLOOD PRESSURE: 80 MMHG | BODY MASS INDEX: 42.09 KG/M2 | WEIGHT: 285 LBS | HEART RATE: 60 BPM | SYSTOLIC BLOOD PRESSURE: 120 MMHG | OXYGEN SATURATION: 97 %

## 2025-06-05 DIAGNOSIS — R93.1 ELEVATED CORONARY ARTERY CALCIUM SCORE: ICD-10-CM

## 2025-06-05 DIAGNOSIS — E66.01 MORBID OBESITY DUE TO EXCESS CALORIES (HCC): ICD-10-CM

## 2025-06-05 DIAGNOSIS — I51.7 LVH (LEFT VENTRICULAR HYPERTROPHY): ICD-10-CM

## 2025-06-05 DIAGNOSIS — Z98.84 HISTORY OF BARIATRIC SURGERY: ICD-10-CM

## 2025-06-05 DIAGNOSIS — Z82.49 FAMILY HISTORY OF HYPERTROPHIC CARDIOMYOPATHY: Primary | ICD-10-CM

## 2025-06-05 PROBLEM — Z00.00 ENCOUNTER FOR ANNUAL PHYSICAL EXAM: Status: RESOLVED | Noted: 2020-08-25 | Resolved: 2025-06-05

## 2025-06-05 PROCEDURE — 99204 OFFICE O/P NEW MOD 45 MIN: CPT | Performed by: INTERNAL MEDICINE

## 2025-06-05 PROCEDURE — 93000 ELECTROCARDIOGRAM COMPLETE: CPT | Performed by: INTERNAL MEDICINE

## 2025-06-05 RX ORDER — ATORVASTATIN CALCIUM 20 MG/1
20 TABLET, FILM COATED ORAL DAILY
Qty: 90 TABLET | Refills: 4 | Status: SHIPPED | OUTPATIENT
Start: 2025-06-05

## 2025-06-05 NOTE — PROGRESS NOTES
Consultation - Cardiology   Shruthi Wren 50 y.o. female MRN: [id number]  Unit/Bed#:  Encounter: [id number]  Physician Requesting Consult: No att. providers found  Reason for Consult / Principal Problem: Elevated coronary calcium score    Assessment:  1. Family history of hypertrophic cardiomyopathy  POCT ECG      2. Morbid obesity due to excess calories (HCC)  POCT ECG      3. Elevated coronary artery calcium score  POCT ECG    atorvastatin (LIPITOR) 20 mg tablet      4. History of bariatric surgery        5. LVH (left ventricular hypertrophy)            Plan:  She is active and denies any cardiac symptoms. She does not have significant risk factors for CAD but coronary calcium score is elevated.   I recommend that she start Lipitor at 20 mg daily with a LDL goal of less than 70.  She needs to keep her BP and blood sugars controlled and continue exercising. No cardiac testing is needed.  Return to office as needed.    History of Present Illness     HPI: Shruthi Wren is a 50 y.o. year old female. She denies any past cardiac history. Her mother has HOCM.    She denies HTN, DM ( although non fasting blood sugar was elevated ). She denies smoking, FH of CAD.    LDL is 103.    She is active and walks 2 miles daily. She denies CP, SOB, palpitations.  ECG is normal.      Wt 285 lbs      ECHO 1/2025 - mild LVH, normal EF    Coronary calcium score 186. . RCA 60.      Review of Systems:    Alert awake oriented, comfortable, denies any complaints  No fevers chills nausea vomiting  No weakness, dizziness, seizures  no cough, shortness of breath, or wheezing  Denies any palpitations, chest pain, diaphoresis  Denies leg edema, pain or paresthesias  Denies any skin rashes  Denies abdominal pain, bloody stools, masses  Denies any depression or suicidal ideations      Historical Information   Past Medical History[1]  Past Surgical History[2]  Social History     Substance and Sexual Activity   Alcohol Use Yes    Alcohol/week:  2.0 standard drinks of alcohol    Types: 2 Standard drinks or equivalent per week    Comment: social     Social History     Substance and Sexual Activity   Drug Use No     Tobacco Use History[3]  Family History: Family history non-contributory    Meds/Allergies   all current active meds have been reviewed  Allergies[4]    Objective   Vitals: Blood pressure 120/80, pulse 60, weight 129 kg (285 lb), SpO2 97%., Body mass index is 42.09 kg/m².,   Weight (last 2 days)       Date/Time Weight    06/05/25 0749 129 (285)                      Physical Exam:  GEN: Shruthi Wren appears well, alert and oriented x 3, pleasant and cooperative   HEENT: pupils equal, round, and reactive to light; extraocular muscles intact  NECK: supple, no carotid bruits   HEART: regular rhythm, normal S1 and S2, no murmurs, clicks, gallops or rubs   LUNGS: clear to auscultation bilaterally; no wheezes, rales, or rhonchi   ABDOMEN: normal bowel sounds, soft, no tenderness, no distention  EXTREMITIES: peripheral pulses normal; no clubbing, cyanosis, or edema  NEURO: no focal findings   SKIN: normal without suspicious lesions on exposed skin    Lab Results:   No visits with results within 1 Day(s) from this visit.   Latest known visit with results is:   Office Visit on 04/01/2025   Component Date Value Ref Range Status    Case Report 04/01/2025    Final                    Value:Gynecologic Cytology Report                       Case: [id number]                                  Authorizing Provider:  Lorenza Mittal PA-C       Collected:           04/01/2025 0903              Ordering Location:     [facility]  Received:            04/01/2025 0903                                     OBGYN                                                                        First Screen:          Harper Jaimes                                                            Specimen:    LIQUID-BASED PAP, SCREENING, Cervix                                                         Primary Interpretation 04/01/2025 Negative for intraepithelial lesion or malignancy   Final    Specimen Adequacy 04/01/2025 Satisfactory for evaluation. Endocervical/transformation zone component present.   Final    Note 04/01/2025    Final                    Value:Screening performed at [facility], [address] [facility] [address].        Additional Information 04/01/2025    Final                    Value:The [employer] FDA approved ,  and ThinPrep Imaging Duo System are utilized with strict adherence to the 's instruction manual to prepare gynecologic and non-gynecologic cytology specimens for the production of ThinPrep slides as well as for gynecologic ThinPrep imaging. These processes have been validated by our laboratory and/or by the .  The Pap test is not a diagnostic procedure and should not be used as the sole means to detect cervical cancer. It is only a screening procedure to aid in the detection of cervical cancer and its precursors. Both false-negative and false-positive results have been experienced. Your patient's test result should be interpreted in this context together with the history and clinical findings.      Gross Description 04/01/2025    Final                    Value:20 ml , colorless, cloudy received in a ThinPrep vial.      HPV Other HR 04/01/2025 Negative  Negative Final    HPV types: 31,33,35,39,45,51,52,56,58,59,66 and 68 DNA are undetectable or below the pre-set threshold.    HPV16 04/01/2025 Negative  Negative Final    HPV18 04/01/2025 Negative  Negative Final                     Imaging: Results Review Statement: No pertinent imaging studies reviewed.                                 [1]   Past Medical History:  Diagnosis Date    Difficulty walking Dec 2022    heal pain    Fallen arches July 2022    Glaucoma     Hypothyroidism     Obesity     Plantar fasciitis Nov 2022    progressively worsened   [2]   Past Surgical  History:  Procedure Laterality Date    BARIATRIC SURGERY  12/18/12    GASTRIC BYPASS      surgery for morbid obesity 12/2012    HERNIA REPAIR      incisional   [3]   Social History  Tobacco Use   Smoking Status Never   Smokeless Tobacco Never   [4] No Known Allergies

## 2025-07-10 NOTE — ASSESSMENT & PLAN NOTE
Score was 186 in May 2025 and patient referred to Cardiology. Patient saw them in June 2025 and started on atorvastatin 20 mg daily at bedtime. Pt advised to follow a well balanced, heart healthy diet and to exercise on a regular basis.   Orders:  •  Lipid panel; Future  •  Hepatic function panel; Future

## 2025-07-11 ENCOUNTER — APPOINTMENT (OUTPATIENT)
Dept: LAB | Facility: HOSPITAL | Age: 50
End: 2025-07-11
Attending: FAMILY MEDICINE
Payer: COMMERCIAL

## 2025-07-11 ENCOUNTER — OFFICE VISIT (OUTPATIENT)
Dept: FAMILY MEDICINE CLINIC | Facility: CLINIC | Age: 50
End: 2025-07-11
Payer: COMMERCIAL

## 2025-07-11 VITALS
WEIGHT: 289 LBS | OXYGEN SATURATION: 98 % | DIASTOLIC BLOOD PRESSURE: 82 MMHG | RESPIRATION RATE: 16 BRPM | HEART RATE: 68 BPM | BODY MASS INDEX: 42.8 KG/M2 | SYSTOLIC BLOOD PRESSURE: 122 MMHG | HEIGHT: 69 IN

## 2025-07-11 DIAGNOSIS — G89.29 CHRONIC PAIN OF LEFT KNEE: ICD-10-CM

## 2025-07-11 DIAGNOSIS — Z23 ENCOUNTER FOR IMMUNIZATION: ICD-10-CM

## 2025-07-11 DIAGNOSIS — Z82.49 FAMILY HISTORY OF HYPERTROPHIC CARDIOMYOPATHY: ICD-10-CM

## 2025-07-11 DIAGNOSIS — Z13.1 SCREENING FOR DIABETES MELLITUS: ICD-10-CM

## 2025-07-11 DIAGNOSIS — E03.9 ACQUIRED HYPOTHYROIDISM: ICD-10-CM

## 2025-07-11 DIAGNOSIS — D50.0 IRON DEFICIENCY ANEMIA DUE TO CHRONIC BLOOD LOSS: ICD-10-CM

## 2025-07-11 DIAGNOSIS — R93.1 ELEVATED CORONARY ARTERY CALCIUM SCORE: ICD-10-CM

## 2025-07-11 DIAGNOSIS — E66.01 MORBID OBESITY DUE TO EXCESS CALORIES (HCC): ICD-10-CM

## 2025-07-11 DIAGNOSIS — M25.562 CHRONIC PAIN OF LEFT KNEE: ICD-10-CM

## 2025-07-11 DIAGNOSIS — Z12.11 SCREENING FOR COLON CANCER: ICD-10-CM

## 2025-07-11 DIAGNOSIS — E03.9 ACQUIRED HYPOTHYROIDISM: Primary | ICD-10-CM

## 2025-07-11 LAB
ALBUMIN SERPL BCG-MCNC: 4.1 G/DL (ref 3.5–5)
ALP SERPL-CCNC: 73 U/L (ref 34–104)
ALT SERPL W P-5'-P-CCNC: 28 U/L (ref 7–52)
AST SERPL W P-5'-P-CCNC: 26 U/L (ref 13–39)
BASOPHILS # BLD AUTO: 0.05 THOUSANDS/ÂΜL (ref 0–0.1)
BASOPHILS NFR BLD AUTO: 1 % (ref 0–1)
BILIRUB DIRECT SERPL-MCNC: 0.08 MG/DL (ref 0–0.2)
BILIRUB SERPL-MCNC: 0.58 MG/DL (ref 0.2–1)
CHOLEST SERPL-MCNC: 141 MG/DL (ref ?–200)
EOSINOPHIL # BLD AUTO: 0.15 THOUSAND/ÂΜL (ref 0–0.61)
EOSINOPHIL NFR BLD AUTO: 2 % (ref 0–6)
ERYTHROCYTE [DISTWIDTH] IN BLOOD BY AUTOMATED COUNT: 12.6 % (ref 11.6–15.1)
EST. AVERAGE GLUCOSE BLD GHB EST-MCNC: 100 MG/DL
FERRITIN SERPL-MCNC: 15 NG/ML (ref 30–307)
HBA1C MFR BLD: 5.1 %
HCT VFR BLD AUTO: 39.2 % (ref 34.8–46.1)
HDLC SERPL-MCNC: 60 MG/DL
HGB BLD-MCNC: 12.4 G/DL (ref 11.5–15.4)
IMM GRANULOCYTES # BLD AUTO: 0.08 THOUSAND/UL (ref 0–0.2)
IMM GRANULOCYTES NFR BLD AUTO: 1 % (ref 0–2)
IRON SATN MFR SERPL: 31 % (ref 15–50)
IRON SERPL-MCNC: 126 UG/DL (ref 50–212)
LDLC SERPL CALC-MCNC: 68 MG/DL (ref 0–100)
LYMPHOCYTES # BLD AUTO: 2.26 THOUSANDS/ÂΜL (ref 0.6–4.47)
LYMPHOCYTES NFR BLD AUTO: 22 % (ref 14–44)
MCH RBC QN AUTO: 31.4 PG (ref 26.8–34.3)
MCHC RBC AUTO-ENTMCNC: 31.6 G/DL (ref 31.4–37.4)
MCV RBC AUTO: 99 FL (ref 82–98)
MONOCYTES # BLD AUTO: 0.58 THOUSAND/ÂΜL (ref 0.17–1.22)
MONOCYTES NFR BLD AUTO: 6 % (ref 4–12)
NEUTROPHILS # BLD AUTO: 7 THOUSANDS/ÂΜL (ref 1.85–7.62)
NEUTS SEG NFR BLD AUTO: 68 % (ref 43–75)
NONHDLC SERPL-MCNC: 81 MG/DL
NRBC BLD AUTO-RTO: 0 /100 WBCS
PLATELET # BLD AUTO: 299 THOUSANDS/UL (ref 149–390)
PMV BLD AUTO: 9.7 FL (ref 8.9–12.7)
PROT SERPL-MCNC: 6.9 G/DL (ref 6.4–8.4)
RBC # BLD AUTO: 3.95 MILLION/UL (ref 3.81–5.12)
T4 FREE SERPL-MCNC: 0.67 NG/DL (ref 0.61–1.12)
TIBC SERPL-MCNC: 403.2 UG/DL (ref 250–450)
TRANSFERRIN SERPL-MCNC: 288 MG/DL (ref 203–362)
TRIGL SERPL-MCNC: 66 MG/DL (ref ?–150)
TSH SERPL DL<=0.05 MIU/L-ACNC: 3.17 UIU/ML (ref 0.45–4.5)
UIBC SERPL-MCNC: 277 UG/DL (ref 155–355)
WBC # BLD AUTO: 10.12 THOUSAND/UL (ref 4.31–10.16)

## 2025-07-11 PROCEDURE — 84443 ASSAY THYROID STIM HORMONE: CPT

## 2025-07-11 PROCEDURE — 85025 COMPLETE CBC W/AUTO DIFF WBC: CPT

## 2025-07-11 PROCEDURE — 83550 IRON BINDING TEST: CPT

## 2025-07-11 PROCEDURE — 80061 LIPID PANEL: CPT

## 2025-07-11 PROCEDURE — 80076 HEPATIC FUNCTION PANEL: CPT

## 2025-07-11 PROCEDURE — 84439 ASSAY OF FREE THYROXINE: CPT

## 2025-07-11 PROCEDURE — 90471 IMMUNIZATION ADMIN: CPT | Performed by: FAMILY MEDICINE

## 2025-07-11 PROCEDURE — 99214 OFFICE O/P EST MOD 30 MIN: CPT | Performed by: FAMILY MEDICINE

## 2025-07-11 PROCEDURE — 83540 ASSAY OF IRON: CPT

## 2025-07-11 PROCEDURE — 36415 COLL VENOUS BLD VENIPUNCTURE: CPT

## 2025-07-11 PROCEDURE — 90750 HZV VACC RECOMBINANT IM: CPT | Performed by: FAMILY MEDICINE

## 2025-07-11 PROCEDURE — 83036 HEMOGLOBIN GLYCOSYLATED A1C: CPT

## 2025-07-11 PROCEDURE — 82728 ASSAY OF FERRITIN: CPT

## 2025-07-11 NOTE — ASSESSMENT & PLAN NOTE
-- DO NOT REPLY / DO NOT REPLY ALL --  -- This inbox is not monitored  -- Message is from [facility] [employer] ([facility]) --      Message Type:  Refill Medication   Refill request for Pended medication named: HYDROcodone-acetaminophen (NORCO) 7.5-325 MG per tablet   Preferred pharmacy verified, and selected.   [id number] - [place], [place] - [address] [facility]    Is the patient OUT of Medication?  Yes and Medication Refills handled by [employer] [facility]        Message: does not have enough for week end                     Discussed smaller portions, healthy snacks, and regular exercise.

## 2025-07-11 NOTE — ASSESSMENT & PLAN NOTE
Has been getting worse and uses brace at times. Will refer to Orthopedics.   Orders:  •  Ambulatory Referral to Orthopedic Surgery; Future

## 2025-07-11 NOTE — ASSESSMENT & PLAN NOTE
Recheck TSH and Free T4. Continue levothyroxine 50 mcg qd.  Orders:  •  TSH, 3rd generation; Future  •  T4, free; Future

## 2025-07-11 NOTE — ASSESSMENT & PLAN NOTE
Recheck CBC and Ferritin.  Orders:  •  Ferritin; Future  •  TIBC Panel (incl. Iron, TIBC, % Iron Saturation); Future  •  CBC and differential; Future

## 2025-07-11 NOTE — PROGRESS NOTES
Name: Shruthi Wren      : 1975      MRN: [id number]  Encounter Provider: Felipe Gonzalez MD  Encounter Date: 2025   Encounter department: [facility] MEDICINE  :  Assessment & Plan  Acquired hypothyroidism  Recheck TSH and Free T4. Continue levothyroxine 50 mcg qd.  Orders:  •  TSH, 3rd generation; Future  •  T4, free; Future    Iron deficiency anemia due to chronic blood loss  Recheck CBC and Ferritin.  Orders:  •  Ferritin; Future  •  TIBC Panel (incl. Iron, TIBC, % Iron Saturation); Future  •  CBC and differential; Future    Elevated coronary artery calcium score  Score was 186 in May 2025 and patient referred to Cardiology. Patient saw them in 2025 and started on atorvastatin 20 mg daily at bedtime. Pt advised to follow a well balanced, heart healthy diet and to exercise on a regular basis.   Orders:  •  Lipid panel; Future  •  Hepatic function panel; Future    Family history of hypertrophic cardiomyopathy  Patient had echocardiogram in 2025 and has mild LVH.        Chronic pain of left knee  Has been getting worse and uses brace at times. Will refer to Orthopedics.   Orders:  •  Ambulatory Referral to Orthopedic Surgery; Future    Morbid obesity due to excess calories (HCC)  Discussed smaller portions, healthy snacks, and regular exercise.       Screening for colon cancer    Orders:  •  Cologuard    Encounter for immunization    Orders:  •  Zoster Vaccine Recombinant IM    Screening for diabetes mellitus    Orders:  •  Hemoglobin A1C; Future          Depression Screening and Follow-up Plan: Patient was screened for depression during today's encounter. They screened negative with a PHQ-2 score of 0.        History of Present Illness   Patient here for follow-up Hypothyroidism, Anemia, Elevated Coronary Calcium Score. Patient doing ok. No chest pain or shortness of breath. No headaches. Patient had CT Coronary Calcium Score in May 2025 and saw Cardiology. Patient was  "started on a statin.Patient has had increased left knee pain and uses brace at times.       Review of Systems   Constitutional:  Negative for fatigue and unexpected weight change.   Respiratory:  Negative for cough, chest tightness and shortness of breath.    Cardiovascular:  Negative for chest pain and palpitations.   Gastrointestinal:  Negative for abdominal pain, constipation, diarrhea, nausea and vomiting.   Genitourinary:  Negative for difficulty urinating.   Musculoskeletal:  Positive for arthralgias.   Skin:  Negative for rash.   Neurological:  Negative for dizziness and headaches.       Objective   /82 (BP Location: Left arm, Patient Position: Sitting, Cuff Size: Large)   Pulse 68   Resp 16   Ht 5' 9\" (1.753 m)   Wt 131 kg (289 lb)   SpO2 98%   BMI 42.68 kg/m²      Physical Exam  Vitals and nursing note reviewed.   Constitutional:       General: She is not in acute distress.     Appearance: Normal appearance. She is well-developed. She is obese.   Neck:      Vascular: No carotid bruit.     Cardiovascular:      Rate and Rhythm: Normal rate and regular rhythm.      Heart sounds: No murmur heard.  Pulmonary:      Effort: Pulmonary effort is normal. No respiratory distress.      Breath sounds: Normal breath sounds.     Musculoskeletal:      Cervical back: Normal range of motion and neck supple.      Right lower leg: No edema.      Left lower leg: No edema.   Lymphadenopathy:      Cervical: No cervical adenopathy.     Neurological:      Mental Status: She is alert.     Psychiatric:         Mood and Affect: Mood normal.         Behavior: Behavior normal.         Thought Content: Thought content normal.         Judgment: Judgment normal.         "

## 2025-07-15 ENCOUNTER — OFFICE VISIT (OUTPATIENT)
Dept: OBGYN CLINIC | Facility: CLINIC | Age: 50
End: 2025-07-15
Payer: COMMERCIAL

## 2025-07-15 ENCOUNTER — HOSPITAL ENCOUNTER (OUTPATIENT)
Dept: RADIOLOGY | Facility: HOSPITAL | Age: 50
Discharge: HOME | End: 2025-07-15
Attending: STUDENT IN AN ORGANIZED HEALTH CARE EDUCATION/TRAINING PROGRAM
Payer: COMMERCIAL

## 2025-07-15 VITALS — BODY MASS INDEX: 43.04 KG/M2 | WEIGHT: 290.6 LBS | HEIGHT: 69 IN

## 2025-07-15 DIAGNOSIS — E66.01 MORBID OBESITY DUE TO EXCESS CALORIES (HCC): ICD-10-CM

## 2025-07-15 DIAGNOSIS — M25.562 CHRONIC PAIN OF LEFT KNEE: ICD-10-CM

## 2025-07-15 DIAGNOSIS — G89.29 CHRONIC PAIN OF LEFT KNEE: ICD-10-CM

## 2025-07-15 DIAGNOSIS — Z01.89 ENCOUNTER FOR LOWER EXTREMITY COMPARISON IMAGING STUDY: ICD-10-CM

## 2025-07-15 DIAGNOSIS — M17.12 PRIMARY OSTEOARTHRITIS OF LEFT KNEE: Primary | ICD-10-CM

## 2025-07-15 PROCEDURE — 73564 X-RAY EXAM KNEE 4 OR MORE: CPT

## 2025-07-15 PROCEDURE — 99204 OFFICE O/P NEW MOD 45 MIN: CPT | Performed by: STUDENT IN AN ORGANIZED HEALTH CARE EDUCATION/TRAINING PROGRAM

## 2025-07-15 PROCEDURE — 73562 X-RAY EXAM OF KNEE 3: CPT

## 2025-07-30 ENCOUNTER — EVALUATION (OUTPATIENT)
Dept: PHYSICAL THERAPY | Facility: REHABILITATION | Age: 50
End: 2025-07-30
Attending: STUDENT IN AN ORGANIZED HEALTH CARE EDUCATION/TRAINING PROGRAM
Payer: COMMERCIAL

## 2025-07-30 DIAGNOSIS — M17.12 PRIMARY OSTEOARTHRITIS OF LEFT KNEE: Primary | ICD-10-CM

## 2025-07-30 PROCEDURE — 97110 THERAPEUTIC EXERCISES: CPT | Performed by: PHYSICAL THERAPIST

## 2025-07-30 PROCEDURE — 97161 PT EVAL LOW COMPLEX 20 MIN: CPT | Performed by: PHYSICAL THERAPIST

## 2025-08-01 ENCOUNTER — OFFICE VISIT (OUTPATIENT)
Dept: PHYSICAL THERAPY | Facility: REHABILITATION | Age: 50
End: 2025-08-01
Attending: STUDENT IN AN ORGANIZED HEALTH CARE EDUCATION/TRAINING PROGRAM
Payer: COMMERCIAL

## 2025-08-01 DIAGNOSIS — M17.12 PRIMARY OSTEOARTHRITIS OF LEFT KNEE: Primary | ICD-10-CM

## 2025-08-01 PROCEDURE — 97530 THERAPEUTIC ACTIVITIES: CPT | Performed by: PHYSICAL THERAPIST

## 2025-08-01 PROCEDURE — 97110 THERAPEUTIC EXERCISES: CPT | Performed by: PHYSICAL THERAPIST

## 2025-08-01 PROCEDURE — 97112 NEUROMUSCULAR REEDUCATION: CPT | Performed by: PHYSICAL THERAPIST

## 2025-08-05 ENCOUNTER — OFFICE VISIT (OUTPATIENT)
Dept: PHYSICAL THERAPY | Facility: REHABILITATION | Age: 50
End: 2025-08-05
Attending: STUDENT IN AN ORGANIZED HEALTH CARE EDUCATION/TRAINING PROGRAM
Payer: COMMERCIAL

## 2025-08-05 DIAGNOSIS — M17.12 PRIMARY OSTEOARTHRITIS OF LEFT KNEE: Primary | ICD-10-CM

## 2025-08-05 PROCEDURE — 97110 THERAPEUTIC EXERCISES: CPT | Performed by: PHYSICAL THERAPIST

## 2025-08-05 PROCEDURE — 97112 NEUROMUSCULAR REEDUCATION: CPT | Performed by: PHYSICAL THERAPIST

## 2025-08-07 ENCOUNTER — OFFICE VISIT (OUTPATIENT)
Dept: PHYSICAL THERAPY | Facility: REHABILITATION | Age: 50
End: 2025-08-07
Attending: STUDENT IN AN ORGANIZED HEALTH CARE EDUCATION/TRAINING PROGRAM
Payer: COMMERCIAL

## 2025-08-07 DIAGNOSIS — M17.12 PRIMARY OSTEOARTHRITIS OF LEFT KNEE: Primary | ICD-10-CM

## 2025-08-07 PROCEDURE — 97110 THERAPEUTIC EXERCISES: CPT | Performed by: PHYSICAL THERAPIST

## 2025-08-07 PROCEDURE — 97530 THERAPEUTIC ACTIVITIES: CPT | Performed by: PHYSICAL THERAPIST

## 2025-08-07 PROCEDURE — 97112 NEUROMUSCULAR REEDUCATION: CPT | Performed by: PHYSICAL THERAPIST

## 2025-08-10 LAB — COLOGUARD RESULT REPORTABLE: NEGATIVE

## 2025-08-11 ENCOUNTER — OFFICE VISIT (OUTPATIENT)
Dept: PHYSICAL THERAPY | Facility: REHABILITATION | Age: 50
End: 2025-08-11
Attending: STUDENT IN AN ORGANIZED HEALTH CARE EDUCATION/TRAINING PROGRAM
Payer: COMMERCIAL

## 2025-08-15 ENCOUNTER — OFFICE VISIT (OUTPATIENT)
Dept: PHYSICAL THERAPY | Facility: REHABILITATION | Age: 50
End: 2025-08-15
Attending: STUDENT IN AN ORGANIZED HEALTH CARE EDUCATION/TRAINING PROGRAM
Payer: COMMERCIAL

## 2025-08-19 ENCOUNTER — OFFICE VISIT (OUTPATIENT)
Dept: PHYSICAL THERAPY | Facility: REHABILITATION | Age: 50
End: 2025-08-19
Attending: STUDENT IN AN ORGANIZED HEALTH CARE EDUCATION/TRAINING PROGRAM
Payer: COMMERCIAL

## 2025-08-19 DIAGNOSIS — M17.12 PRIMARY OSTEOARTHRITIS OF LEFT KNEE: Primary | ICD-10-CM

## 2025-08-19 PROCEDURE — 97530 THERAPEUTIC ACTIVITIES: CPT | Performed by: PHYSICAL THERAPIST

## 2025-08-19 PROCEDURE — 97110 THERAPEUTIC EXERCISES: CPT | Performed by: PHYSICAL THERAPIST

## 2025-08-21 ENCOUNTER — OFFICE VISIT (OUTPATIENT)
Dept: PHYSICAL THERAPY | Facility: REHABILITATION | Age: 50
End: 2025-08-21
Attending: STUDENT IN AN ORGANIZED HEALTH CARE EDUCATION/TRAINING PROGRAM
Payer: COMMERCIAL

## 2025-08-21 DIAGNOSIS — M17.12 PRIMARY OSTEOARTHRITIS OF LEFT KNEE: Primary | ICD-10-CM

## 2025-08-21 PROCEDURE — 97530 THERAPEUTIC ACTIVITIES: CPT | Performed by: PHYSICAL THERAPIST

## 2025-08-21 PROCEDURE — 97110 THERAPEUTIC EXERCISES: CPT | Performed by: PHYSICAL THERAPIST

## 2025-08-21 PROCEDURE — 97112 NEUROMUSCULAR REEDUCATION: CPT | Performed by: PHYSICAL THERAPIST
